# Patient Record
Sex: MALE | NOT HISPANIC OR LATINO
[De-identification: names, ages, dates, MRNs, and addresses within clinical notes are randomized per-mention and may not be internally consistent; named-entity substitution may affect disease eponyms.]

---

## 2017-04-10 ENCOUNTER — CHARTING TRANS (OUTPATIENT)
Dept: OTHER | Age: 82
End: 2017-04-10

## 2017-04-10 ENCOUNTER — HOSPITAL (OUTPATIENT)
Dept: OTHER | Age: 82
End: 2017-04-10
Attending: PSYCHIATRY & NEUROLOGY

## 2017-04-10 ENCOUNTER — DIAGNOSTIC TRANS (OUTPATIENT)
Dept: OTHER | Age: 82
End: 2017-04-10

## 2017-04-10 LAB
ALBUMIN SERPL-MCNC: 3.4 GM/DL (ref 3.6–5.1)
ALP SERPL-CCNC: 67 UNIT/L (ref 45–117)
ALT SERPL-CCNC: 21 UNIT/L
AMORPH SED URNS QL MICRO: ABNORMAL
ANALYZER ANC (IANC): ABNORMAL
ANION GAP SERPL CALC-SCNC: 17 MMOL/L (ref 10–20)
APPEARANCE UR: ABNORMAL
APTT PPP: 25 SECONDS (ref 22–30)
APTT PPP: NORMAL S
AST SERPL-CCNC: 38 UNIT/L
BACTERIA #/AREA URNS HPF: ABNORMAL /HPF
BASOPHILS # BLD: 0 THOUSAND/MCL (ref 0–0.3)
BASOPHILS NFR BLD: 0 %
BILIRUB CONJ SERPL-MCNC: 0.1 MG/DL (ref 0–0.2)
BILIRUB SERPL-MCNC: 1 MG/DL (ref 0.2–1)
BILIRUB UR QL: NEGATIVE
BUN SERPL-MCNC: 37 MG/DL (ref 6–20)
BUN/CREAT SERPL: 20 (ref 7–25)
CALCIUM SERPL-MCNC: 8.6 MG/DL (ref 8.4–10.2)
CAOX CRY URNS QL MICRO: ABNORMAL
CHLORIDE: 106 MMOL/L (ref 98–107)
CK SERPL-CCNC: 911 UNIT/L (ref 39–308)
CO2 SERPL-SCNC: 23 MMOL/L (ref 21–32)
COLOR UR: YELLOW
CREAT SERPL-MCNC: 1.84 MG/DL (ref 0.67–1.17)
DIFFERENTIAL METHOD BLD: ABNORMAL
EOSINOPHIL # BLD: 0 THOUSAND/MCL (ref 0.1–0.5)
EOSINOPHIL NFR BLD: 0 %
EPITH CASTS #/AREA URNS LPF: ABNORMAL /[LPF]
ERYTHROCYTE [DISTWIDTH] IN BLOOD: 19.1 % (ref 11–15)
FATTY CASTS #/AREA URNS LPF: ABNORMAL /[LPF]
FERRITIN SERPL-MCNC: 13 NG/ML (ref 26–388)
GLUCOSE BLDC GLUCOMTR-MCNC: 116 MG/DL (ref 65–99)
GLUCOSE SERPL-MCNC: 111 MG/DL (ref 65–99)
GLUCOSE UR-MCNC: NEGATIVE MG/DL
GRAN CASTS #/AREA URNS LPF: ABNORMAL /[LPF]
HEMATOCRIT: 21.1 % (ref 39–51)
HEMATOCRIT: 25.4 % (ref 39–51)
HGB BLD-MCNC: 5.4 GM/DL (ref 13–17)
HGB BLD-MCNC: 7.1 GM/DL (ref 13–17)
HGB UR QL: ABNORMAL
HYALINE CASTS #/AREA URNS LPF: ABNORMAL /LPF (ref 0–5)
INR PPP: 1.1
IRON SATN MFR SERPL: 4 % (ref 15–45)
IRON SERPL-MCNC: 16 MCG/DL (ref 65–175)
KETONES UR-MCNC: ABNORMAL MG/DL
LACTATE BLDV-MCNC: 1.5 MMOL/L
LEUKOCYTE ESTERASE UR QL STRIP: NEGATIVE
LIPASE SERPL-CCNC: 139 UNIT/L (ref 73–393)
LYMPHOCYTES # BLD: 0.7 THOUSAND/MCL (ref 1–4)
LYMPHOCYTES NFR BLD: 8 %
MCH RBC QN AUTO: 15.7 PG (ref 26–34)
MCHC RBC AUTO-ENTMCNC: 25.6 GM/DL (ref 32–36.5)
MCV RBC AUTO: 61.2 FL (ref 78–100)
MIXED CELL CASTS #/AREA URNS LPF: ABNORMAL /[LPF]
MONOCYTES # BLD: 1 THOUSAND/MCL (ref 0.3–0.9)
MONOCYTES NFR BLD: 10 %
MUCOUS THREADS URNS QL MICRO: PRESENT
NEUTROPHILS # BLD: 7.8 THOUSAND/MCL (ref 1.8–7.7)
NEUTROPHILS NFR BLD: 82 %
NEUTS SEG NFR BLD: ABNORMAL %
NITRITE UR QL: NEGATIVE
PERCENT NRBC: ABNORMAL
PH UR: 5 UNIT (ref 5–7)
PLATELET # BLD: 263 THOUSAND/MCL (ref 140–450)
POTASSIUM SERPL-SCNC: 4.8 MMOL/L (ref 3.4–5.1)
PROT SERPL-MCNC: 6.9 GM/DL (ref 6.4–8.2)
PROT UR QL: 30 MG/DL
PROTHROMBIN TIME: 12.1 SECONDS (ref 9.7–11.8)
PROTHROMBIN TIME: ABNORMAL
RBC # BLD: 3.45 MILLION/MCL (ref 4.5–5.9)
RBC #/AREA URNS HPF: ABNORMAL /HPF (ref 0–3)
RBC CASTS #/AREA URNS LPF: ABNORMAL /[LPF]
RENAL EPI CELLS #/AREA URNS HPF: ABNORMAL /[HPF]
SODIUM SERPL-SCNC: 141 MMOL/L (ref 135–145)
SP GR UR: 1.02 (ref 1–1.03)
SPECIMEN SOURCE: ABNORMAL
SPERM URNS QL MICRO: ABNORMAL
SQUAMOUS #/AREA URNS HPF: ABNORMAL /HPF (ref 0–5)
T VAGINALIS URNS QL MICRO: ABNORMAL
TIBC SERPL-MCNC: 437 MCG/DL (ref 250–450)
TRI-PHOS CRY URNS QL MICRO: ABNORMAL
TROPONIN I SERPL HS-MCNC: 0.6 NG/ML
TROPONIN I SERPL HS-MCNC: 0.86 NG/ML
TROPONIN I SERPL HS-MCNC: 0.87 NG/ML
URATE CRY URNS QL MICRO: ABNORMAL
URINE REFLEX: ABNORMAL
URNS CMNT MICRO: ABNORMAL
UROBILINOGEN UR QL: 0.2 MG/DL (ref 0–1)
WAXY CASTS #/AREA URNS LPF: ABNORMAL /[LPF]
WBC # BLD: 9.5 THOUSAND/MCL (ref 4.2–11)
WBC #/AREA URNS HPF: ABNORMAL /HPF (ref 0–5)
WBC CASTS #/AREA URNS LPF: ABNORMAL /[LPF]
YEAST HYPHAE URNS QL MICRO: ABNORMAL
YEAST URNS QL MICRO: ABNORMAL

## 2017-04-11 LAB
ALBUMIN SERPL-MCNC: 3.2 GM/DL (ref 3.6–5.1)
ALBUMIN/GLOB SERPL: 0.9 {RATIO} (ref 1–2.4)
ALP SERPL-CCNC: 61 UNIT/L (ref 45–117)
ALT SERPL-CCNC: 34 UNIT/L
ANALYZER ANC (IANC): ABNORMAL
ANION GAP SERPL CALC-SCNC: 14 MMOL/L (ref 10–20)
AST SERPL-CCNC: 96 UNIT/L
BASOPHILS # BLD: 0 THOUSAND/MCL (ref 0–0.3)
BASOPHILS NFR BLD: 1 %
BILIRUB SERPL-MCNC: 1.2 MG/DL (ref 0.2–1)
BUN SERPL-MCNC: 30 MG/DL (ref 6–20)
BUN/CREAT SERPL: 21 (ref 7–25)
CALCIUM SERPL-MCNC: 7.9 MG/DL (ref 8.4–10.2)
CHLORIDE: 108 MMOL/L (ref 98–107)
CK SERPL-CCNC: 2092 UNIT/L (ref 39–308)
CO2 SERPL-SCNC: 23 MMOL/L (ref 21–32)
CREAT SERPL-MCNC: 1.44 MG/DL (ref 0.67–1.17)
DIFFERENTIAL METHOD BLD: ABNORMAL
EOSINOPHIL # BLD: 0.1 THOUSAND/MCL (ref 0.1–0.5)
EOSINOPHIL NFR BLD: 1 %
ERYTHROCYTE [DISTWIDTH] IN BLOOD: 24 % (ref 11–15)
FOLATE SERPL-MCNC: >24 NG/ML
GLOBULIN SER-MCNC: 3.5 GM/DL (ref 2–4)
GLUCOSE SERPL-MCNC: 95 MG/DL (ref 65–99)
HEMATOCRIT: 30.4 % (ref 39–51)
HGB A1 MFR BLD ELPH: 98.4 % (ref 96.4–98.2)
HGB A2 MFR BLD ELPH: 1.6 % (ref 1.8–3.4)
HGB BLD-MCNC: 8.4 GM/DL (ref 13–17)
HGB C MFR BLD ELPH: ABNORMAL %
HGB F MFR BLD ELPH: ABNORMAL % (ref 0–2)
HGB FRACT BLD ELPH-IMP: ABNORMAL
HGB OTHER MFR BLD ELPH: ABNORMAL %
HGB S MFR BLD ELPH: ABNORMAL %
HYPOCHROMIA (HYPOC): ABNORMAL
LYMPHOCYTES # BLD: 1.1 THOUSAND/MCL (ref 1–4)
LYMPHOCYTES NFR BLD: 16 %
MCH RBC QN AUTO: 18.5 PG (ref 26–34)
MCHC RBC AUTO-ENTMCNC: 27.6 GM/DL (ref 32–36.5)
MCV RBC AUTO: 66.8 FL (ref 78–100)
MICROCYTOSIS (MICY): ABNORMAL
MONOCYTES # BLD: 0.5 THOUSAND/MCL (ref 0.3–0.9)
MONOCYTES NFR BLD: 7 %
NEUTROPHILS # BLD: 5.5 THOUSAND/MCL (ref 1.8–7.7)
NEUTROPHILS NFR BLD: 75 %
NEUTS SEG NFR BLD: ABNORMAL %
OVALOCYTES (OVALO): ABNORMAL
PATHOLOGIST NAME: ABNORMAL
PERCENT NRBC: ABNORMAL
PLATELET # BLD: 212 THOUSAND/MCL (ref 140–450)
POTASSIUM SERPL-SCNC: 4 MMOL/L (ref 3.4–5.1)
PROT SERPL-MCNC: 6.7 GM/DL (ref 6.4–8.2)
RBC # BLD: 4.55 MILLION/MCL (ref 4.5–5.9)
SHISTOCYTES (SHSTO): ABNORMAL
SODIUM SERPL-SCNC: 141 MMOL/L (ref 135–145)
TROPONIN I SERPL HS-MCNC: 0.65 NG/ML
VIT B12 SERPL-MCNC: 556 PG/ML (ref 211–911)
WBC # BLD: 7.3 THOUSAND/MCL (ref 4.2–11)

## 2017-04-12 ENCOUNTER — DIAGNOSTIC TRANS (OUTPATIENT)
Dept: OTHER | Age: 82
End: 2017-04-12

## 2017-04-12 ENCOUNTER — CHARTING TRANS (OUTPATIENT)
Dept: OTHER | Age: 82
End: 2017-04-12

## 2017-04-12 LAB
ALBUMIN SERPL-MCNC: 2.7 GM/DL (ref 3.6–5.1)
ALBUMIN/GLOB SERPL: 0.8 {RATIO} (ref 1–2.4)
ALP SERPL-CCNC: 52 UNIT/L (ref 45–117)
ALT SERPL-CCNC: 32 UNIT/L
ANALYZER ANC (IANC): ABNORMAL
ANION GAP SERPL CALC-SCNC: 15 MMOL/L (ref 10–20)
AST SERPL-CCNC: 85 UNIT/L
BASOPHILS # BLD: 0 THOUSAND/MCL (ref 0–0.3)
BASOPHILS NFR BLD: 0 %
BILIRUB CONJ SERPL-MCNC: 0.1 MG/DL (ref 0–0.2)
BILIRUB SERPL-MCNC: 0.8 MG/DL (ref 0.2–1)
BUN SERPL-MCNC: 18 MG/DL (ref 6–20)
BUN/CREAT SERPL: 16 (ref 7–25)
CALCIUM SERPL-MCNC: 7.8 MG/DL (ref 8.4–10.2)
CHLORIDE: 109 MMOL/L (ref 98–107)
CK SERPL-CCNC: 1041 UNIT/L (ref 39–308)
CO2 SERPL-SCNC: 23 MMOL/L (ref 21–32)
CREAT SERPL-MCNC: 1.15 MG/DL (ref 0.67–1.17)
DIFFERENTIAL METHOD BLD: ABNORMAL
EOSINOPHIL # BLD: 0.1 THOUSAND/MCL (ref 0.1–0.5)
EOSINOPHIL NFR BLD: 1 %
ERYTHROCYTE [DISTWIDTH] IN BLOOD: 24.5 % (ref 11–15)
FOLATE SERPL-MCNC: >24 NG/ML
GLOBULIN SER-MCNC: 3.3 GM/DL (ref 2–4)
GLUCOSE SERPL-MCNC: 102 MG/DL (ref 65–99)
HAPTOGLOB SERPL-MCNC: 235 MG/DL (ref 36–195)
HEMATOCRIT: 27.2 % (ref 39–51)
HGB BLD-MCNC: 7.4 GM/DL (ref 13–17)
HYPOCHROMIA (HYPOC): ABNORMAL
LACTATE BLDV-SCNC: 1.1 MMOL/L (ref 0–2)
LDH SERPL-CCNC: 255 UNIT/L (ref 86–234)
LYMPHOCYTES # BLD: 1 THOUSAND/MCL (ref 1–4)
LYMPHOCYTES NFR BLD: 11 %
MCH RBC QN AUTO: 18.4 PG (ref 26–34)
MCHC RBC AUTO-ENTMCNC: 27.2 GM/DL (ref 32–36.5)
MCV RBC AUTO: 67.5 FL (ref 78–100)
METAMYELOCYTES NFR BLD: 2 % (ref 0–2)
MICROCYTOSIS (MICY): ABNORMAL
MONOCYTES # BLD: 0.9 THOUSAND/MCL (ref 0.3–0.9)
MONOCYTES NFR BLD: 10 %
NEUTROPHILS # BLD: 7.1 THOUSAND/MCL (ref 1.8–7.7)
NEUTS BAND NFR BLD: 2 %
NEUTS SEG NFR BLD: 74 %
OVALOCYTES (OVALO): ABNORMAL
PATH REV BLD -IMP: ABNORMAL
PLAT MORPH BLD: NORMAL
PLATELET # BLD: 203 THOUSAND/MCL (ref 140–450)
POTASSIUM SERPL-SCNC: 4.4 MMOL/L (ref 3.4–5.1)
PROT SERPL-MCNC: 6 GM/DL (ref 6.4–8.2)
RBC # BLD: 4.03 MILLION/MCL (ref 4.5–5.9)
SHISTOCYTES (SHSTO): ABNORMAL
SODIUM SERPL-SCNC: 143 MMOL/L (ref 135–145)
TSH SERPL-ACNC: 3.2 MCUNIT/ML (ref 0.35–5)
VIT B12 SERPL-MCNC: 622 PG/ML (ref 211–911)
WBC # BLD: 9.3 THOUSAND/MCL (ref 4.2–11)
WBC MORPH BLD: NORMAL

## 2017-04-13 LAB
ALBUMIN SERPL-MCNC: 2.6 GM/DL (ref 3.6–5.1)
ALBUMIN/GLOB SERPL: 0.8 {RATIO} (ref 1–2.4)
ALP SERPL-CCNC: 52 UNIT/L (ref 45–117)
ALT SERPL-CCNC: 39 UNIT/L
ANALYZER ANC (IANC): ABNORMAL
ANION GAP SERPL CALC-SCNC: 15 MMOL/L (ref 10–20)
AST SERPL-CCNC: 101 UNIT/L
BASOPHILS # BLD: 0 THOUSAND/MCL (ref 0–0.3)
BASOPHILS NFR BLD: 0 %
BILIRUB SERPL-MCNC: 0.9 MG/DL (ref 0.2–1)
BUN SERPL-MCNC: 19 MG/DL (ref 6–20)
BUN/CREAT SERPL: 17 (ref 7–25)
CALCIUM SERPL-MCNC: 7.8 MG/DL (ref 8.4–10.2)
CHLORIDE: 101 MMOL/L (ref 98–107)
CK SERPL-CCNC: 1304 UNIT/L (ref 39–308)
CO2 SERPL-SCNC: 24 MMOL/L (ref 21–32)
CREAT SERPL-MCNC: 1.15 MG/DL (ref 0.67–1.17)
D DIMER PPP FEU-MCNC: 2.98 MG/L FEU
DIFFERENTIAL METHOD BLD: ABNORMAL
EOSINOPHIL # BLD: 0 THOUSAND/MCL (ref 0.1–0.5)
EOSINOPHIL NFR BLD: 0 %
ERYTHROCYTE [DISTWIDTH] IN BLOOD: 25.2 % (ref 11–15)
GLOBULIN SER-MCNC: 3.2 GM/DL (ref 2–4)
GLUCOSE SERPL-MCNC: 109 MG/DL (ref 65–99)
HEMATOCRIT: 26.2 % (ref 39–51)
HGB BLD-MCNC: 7.3 GM/DL (ref 13–17)
HYPOCHROMIA (HYPOC): ABNORMAL
IMMATURE RETIC FRACTION: 25.3 % (ref 1.5–16)
LYMPHOCYTES # BLD: 1.1 THOUSAND/MCL (ref 1–4)
LYMPHOCYTES NFR BLD: 15 %
MCH RBC QN AUTO: 18.5 PG (ref 26–34)
MCHC RBC AUTO-ENTMCNC: 27.9 GM/DL (ref 32–36.5)
MCV RBC AUTO: 66.5 FL (ref 78–100)
MICROCYTOSIS (MICY): ABNORMAL
MONOCYTES # BLD: 0.6 THOUSAND/MCL (ref 0.3–0.9)
MONOCYTES NFR BLD: 8 %
NEUTROPHILS # BLD: 5.7 THOUSAND/MCL (ref 1.8–7.7)
NEUTROPHILS NFR BLD: 77 %
NEUTS SEG NFR BLD: ABNORMAL %
OVALOCYTES (OVALO): ABNORMAL
PERCENT NRBC: ABNORMAL
PLAT MORPH BLD: NORMAL
PLATELET # BLD: 191 THOUSAND/MCL (ref 140–450)
POTASSIUM SERPL-SCNC: 3.6 MMOL/L (ref 3.4–5.1)
PROCALCITONIN SERPL IA-MCNC: 0.14 NG/ML
PROT SERPL-MCNC: 5.8 GM/DL (ref 6.4–8.2)
RBC # BLD: 3.94 MILLION/MCL (ref 4.5–5.9)
RETICS #: 15 THOUSAND/MCL (ref 10–120)
RETICS/RBC NFR: 0.4 % (ref 0.3–2.5)
SHISTOCYTES (SHSTO): ABNORMAL
SODIUM SERPL-SCNC: 136 MMOL/L (ref 135–145)
WBC # BLD: 7.3 THOUSAND/MCL (ref 4.2–11)
WBC MORPH BLD: NORMAL

## 2017-04-14 LAB
ALBUMIN SERPL-MCNC: 2.4 GM/DL (ref 3.6–5.1)
ALBUMIN/GLOB SERPL: 0.7 {RATIO} (ref 1–2.4)
ALP SERPL-CCNC: 57 UNIT/L (ref 45–117)
ALT SERPL-CCNC: 39 UNIT/L
ANALYZER ANC (IANC): ABNORMAL
ANION GAP SERPL CALC-SCNC: 11 MMOL/L (ref 10–20)
AST SERPL-CCNC: 90 UNIT/L
BASOPHILS # BLD: 0 THOUSAND/MCL (ref 0–0.3)
BASOPHILS NFR BLD: 0 %
BILIRUB SERPL-MCNC: 1 MG/DL (ref 0.2–1)
BUN SERPL-MCNC: 14 MG/DL (ref 6–20)
BUN/CREAT SERPL: 13 (ref 7–25)
CALCIUM SERPL-MCNC: 7.9 MG/DL (ref 8.4–10.2)
CHLORIDE: 108 MMOL/L (ref 98–107)
CK SERPL-CCNC: 845 UNIT/L (ref 39–308)
CO2 SERPL-SCNC: 26 MMOL/L (ref 21–32)
COLLECT DURATION TIME UR: 24 HR
CREAT SERPL-MCNC: 1.12 MG/DL (ref 0.67–1.17)
DIFFERENTIAL METHOD BLD: ABNORMAL
EOSINOPHIL # BLD: 0 THOUSAND/MCL (ref 0.1–0.5)
EOSINOPHIL NFR BLD: 0 %
ERYTHROCYTE [DISTWIDTH] IN BLOOD: 25.6 % (ref 11–15)
GLOBULIN SER-MCNC: 3.5 GM/DL (ref 2–4)
GLUCOSE SERPL-MCNC: 110 MG/DL (ref 65–99)
GLYCOHEMOGLOBIN: 6 % (ref 4.5–5.6)
HEMATOCRIT: 26.7 % (ref 39–51)
HGB BLD-MCNC: 7.4 GM/DL (ref 13–17)
LYMPHOCYTES # BLD: 1.2 THOUSAND/MCL (ref 1–4)
LYMPHOCYTES NFR BLD: 15 %
M PROTEIN 1 SERPL ELPH-MCNC: ABNORMAL GM/DL
M PROTEIN 2 SERPL ELPH-MCNC: ABNORMAL GM/DL
MCH RBC QN AUTO: 18.5 PG (ref 26–34)
MCHC RBC AUTO-ENTMCNC: 27.7 GM/DL (ref 32–36.5)
MCV RBC AUTO: 66.6 FL (ref 78–100)
MONOCYTES # BLD: 0.7 THOUSAND/MCL (ref 0.3–0.9)
MONOCYTES NFR BLD: 9 %
NEUTROPHILS # BLD: 6.2 THOUSAND/MCL (ref 1.8–7.7)
NEUTROPHILS NFR BLD: 76 %
NEUTS SEG NFR BLD: ABNORMAL %
PATH INTERP SPEC-IMP: ABNORMAL
PATH INTERP SPEC-IMP: ABNORMAL
PERCENT NRBC: ABNORMAL
PLATELET # BLD: 208 THOUSAND/MCL (ref 140–450)
POTASSIUM SERPL-SCNC: 3.4 MMOL/L (ref 3.4–5.1)
PROT 24H UR-MRATE: 2440 MG/24 HR (ref 0–148)
PROT ?TM UR-MCNC: 244 MG/DL
PROT SERPL-MCNC: 5.5 GM/DL (ref 6.4–8.2)
PROT SERPL-MCNC: 5.9 GM/DL (ref 6.4–8.2)
RBC # BLD: 4.01 MILLION/MCL (ref 4.5–5.9)
SODIUM SERPL-SCNC: 142 MMOL/L (ref 135–145)
SPECIMEN VOL UR: 1000 ML
WBC # BLD: 8.2 THOUSAND/MCL (ref 4.2–11)

## 2017-04-15 LAB
CK SERPL-CCNC: 289 UNIT/L (ref 39–308)
GLUCOSE BLDC GLUCOMTR-MCNC: 99 MG/DL (ref 65–99)

## 2017-04-16 LAB
ALBUMIN SERPL-MCNC: 2.3 GM/DL (ref 3.6–5.1)
ALBUMIN/GLOB SERPL: 0.7 {RATIO} (ref 1–2.4)
ALP SERPL-CCNC: 79 UNIT/L (ref 45–117)
ALT SERPL-CCNC: 42 UNIT/L
ANALYZER ANC (IANC): ABNORMAL
ANION GAP SERPL CALC-SCNC: 14 MMOL/L (ref 10–20)
AST SERPL-CCNC: 58 UNIT/L
BASOPHILS # BLD: 0 THOUSAND/MCL (ref 0–0.3)
BASOPHILS NFR BLD: 0 %
BILIRUB SERPL-MCNC: 0.7 MG/DL (ref 0.2–1)
BUN SERPL-MCNC: 13 MG/DL (ref 6–20)
BUN/CREAT SERPL: 13 (ref 7–25)
CALCIUM SERPL-MCNC: 7.8 MG/DL (ref 8.4–10.2)
CHLORIDE: 114 MMOL/L (ref 98–107)
CO2 SERPL-SCNC: 23 MMOL/L (ref 21–32)
CREAT SERPL-MCNC: 0.96 MG/DL (ref 0.67–1.17)
CREAT SERPL-MCNC: 0.97 MG/DL (ref 0.67–1.17)
CREAT SERPL-MCNC: 1.02 MG/DL (ref 0.67–1.17)
DIFFERENTIAL METHOD BLD: ABNORMAL
EOSINOPHIL # BLD: 0.2 THOUSAND/MCL (ref 0.1–0.5)
EOSINOPHIL NFR BLD: 2 %
ERYTHROCYTE [DISTWIDTH] IN BLOOD: 26.4 % (ref 11–15)
GLOBULIN SER-MCNC: 3.4 GM/DL (ref 2–4)
GLUCOSE SERPL-MCNC: 112 MG/DL (ref 65–99)
HEMATOCRIT: 26.5 % (ref 39–51)
HGB BLD-MCNC: 7.3 GM/DL (ref 13–17)
LYMPHOCYTES # BLD: 1 THOUSAND/MCL (ref 1–4)
LYMPHOCYTES NFR BLD: 11 %
MCH RBC QN AUTO: 18.5 PG (ref 26–34)
MCHC RBC AUTO-ENTMCNC: 27.5 GM/DL (ref 32–36.5)
MCV RBC AUTO: 67.3 FL (ref 78–100)
MONOCYTES # BLD: 0.8 THOUSAND/MCL (ref 0.3–0.9)
MONOCYTES NFR BLD: 9 %
NEUTROPHILS # BLD: 7.7 THOUSAND/MCL (ref 1.8–7.7)
NEUTROPHILS NFR BLD: 78 %
NEUTS SEG NFR BLD: ABNORMAL %
PERCENT NRBC: ABNORMAL
PHOSPHATE SERPL-MCNC: 1.8 MG/DL (ref 2.4–4.7)
PLATELET # BLD: 252 THOUSAND/MCL (ref 140–450)
POTASSIUM SERPL-SCNC: 3 MMOL/L (ref 3.4–5.1)
POTASSIUM SERPL-SCNC: 3.1 MMOL/L (ref 3.4–5.1)
POTASSIUM SERPL-SCNC: 4.1 MMOL/L (ref 3.4–5.1)
PROT SERPL-MCNC: 5.7 GM/DL (ref 6.4–8.2)
RBC # BLD: 3.94 MILLION/MCL (ref 4.5–5.9)
SODIUM SERPL-SCNC: 148 MMOL/L (ref 135–145)
WBC # BLD: 9.8 THOUSAND/MCL (ref 4.2–11)

## 2017-04-17 LAB
ACANTHOCYTES (ACANT): ABNORMAL
ALBUMIN SERPL-MCNC: 2.4 GM/DL (ref 3.6–5.1)
ALBUMIN/GLOB SERPL: 0.7 {RATIO} (ref 1–2.4)
ALP SERPL-CCNC: 101 UNIT/L (ref 45–117)
ALT SERPL-CCNC: 48 UNIT/L
ANALYZER ANC (IANC): ABNORMAL
ANION GAP SERPL CALC-SCNC: 13 MMOL/L (ref 10–20)
AST SERPL-CCNC: 67 UNIT/L
BASOPHILS # BLD: 0 THOUSAND/MCL (ref 0–0.3)
BASOPHILS NFR BLD: 0 %
BILIRUB SERPL-MCNC: 0.5 MG/DL (ref 0.2–1)
BUN SERPL-MCNC: 10 MG/DL (ref 6–20)
BUN/CREAT SERPL: 10 (ref 7–25)
CALCIUM SERPL-MCNC: 8 MG/DL (ref 8.4–10.2)
CHLORIDE: 115 MMOL/L (ref 98–107)
CO2 SERPL-SCNC: 23 MMOL/L (ref 21–32)
CREAT SERPL-MCNC: 0.98 MG/DL (ref 0.67–1.17)
DIFFERENTIAL METHOD BLD: ABNORMAL
EOSINOPHIL # BLD: 0.4 THOUSAND/MCL (ref 0.1–0.5)
EOSINOPHIL NFR BLD: 4 %
ERYTHROCYTE [DISTWIDTH] IN BLOOD: 26.8 % (ref 11–15)
GLOBULIN SER-MCNC: 3.6 GM/DL (ref 2–4)
GLUCOSE SERPL-MCNC: 132 MG/DL (ref 65–99)
HEMATOCRIT: 28.2 % (ref 39–51)
HGB BLD-MCNC: 7.7 GM/DL (ref 13–17)
HYPOCHROMIA (HYPOC): ABNORMAL
INR PPP: 1.1
LYMPHOCYTES # BLD: 1.2 THOUSAND/MCL (ref 1–4)
LYMPHOCYTES NFR BLD: 13 %
MCH RBC QN AUTO: 18.5 PG (ref 26–34)
MCHC RBC AUTO-ENTMCNC: 27.3 GM/DL (ref 32–36.5)
MCV RBC AUTO: 67.6 FL (ref 78–100)
MICROCYTOSIS (MICY): ABNORMAL
MONOCYTES # BLD: 0.8 THOUSAND/MCL (ref 0.3–0.9)
MONOCYTES NFR BLD: 9 %
NEUTROPHILS # BLD: 6.9 THOUSAND/MCL (ref 1.8–7.7)
NEUTROPHILS NFR BLD: 74 %
NEUTS SEG NFR BLD: ABNORMAL %
OVALOCYTES (OVALO): ABNORMAL
PERCENT NRBC: ABNORMAL
PHOSPHATE SERPL-MCNC: 1.4 MG/DL (ref 2.4–4.7)
PLAT MORPH BLD: NORMAL
PLATELET # BLD: 278 THOUSAND/MCL (ref 140–450)
POLYCHROMASIA (POLY): ABNORMAL
POTASSIUM SERPL-SCNC: 3.3 MMOL/L (ref 3.4–5.1)
PROT SERPL-MCNC: 6 GM/DL (ref 6.4–8.2)
PROTHROMBIN TIME: 12.2 SECONDS (ref 9.7–11.8)
PROTHROMBIN TIME: ABNORMAL
RBC # BLD: 4.17 MILLION/MCL (ref 4.5–5.9)
SHISTOCYTES (SHSTO): ABNORMAL
SODIUM SERPL-SCNC: 148 MMOL/L (ref 135–145)
TEAR DROP CELLS (TEARD): ABNORMAL
WBC # BLD: 9.3 THOUSAND/MCL (ref 4.2–11)
WBC MORPH BLD: NORMAL

## 2017-04-18 LAB
ALBUMIN SERPL-MCNC: 2.2 GM/DL (ref 3.6–5.1)
ALBUMIN/GLOB SERPL: 0.6 {RATIO} (ref 1–2.4)
ALP SERPL-CCNC: 95 UNIT/L (ref 45–117)
ALT SERPL-CCNC: 46 UNIT/L
ANALYZER ANC (IANC): ABNORMAL
ANION GAP SERPL CALC-SCNC: 12 MMOL/L (ref 10–20)
AST SERPL-CCNC: 48 UNIT/L
BASOPHILS # BLD: 0 THOUSAND/MCL (ref 0–0.3)
BASOPHILS NFR BLD: 0 %
BILIRUB SERPL-MCNC: 0.5 MG/DL (ref 0.2–1)
BUN SERPL-MCNC: 8 MG/DL (ref 6–20)
BUN/CREAT SERPL: 8 (ref 7–25)
CALCIUM SERPL-MCNC: 7.8 MG/DL (ref 8.4–10.2)
CHLORIDE: 111 MMOL/L (ref 98–107)
CO2 SERPL-SCNC: 25 MMOL/L (ref 21–32)
CREAT SERPL-MCNC: 0.97 MG/DL (ref 0.67–1.17)
DIFFERENTIAL METHOD BLD: ABNORMAL
EOSINOPHIL # BLD: 0.5 THOUSAND/MCL (ref 0.1–0.5)
EOSINOPHIL NFR BLD: 5 %
ERYTHROCYTE [DISTWIDTH] IN BLOOD: 27.3 % (ref 11–15)
GLOBULIN SER-MCNC: 3.5 GM/DL (ref 2–4)
GLUCOSE SERPL-MCNC: 119 MG/DL (ref 65–99)
HEMATOCRIT: 26.7 % (ref 39–51)
HGB BLD-MCNC: 7.2 GM/DL (ref 13–17)
INR PPP: 1.1
LYMPHOCYTES # BLD: 1.6 THOUSAND/MCL (ref 1–4)
LYMPHOCYTES NFR BLD: 17 %
MCH RBC QN AUTO: 18.6 PG (ref 26–34)
MCHC RBC AUTO-ENTMCNC: 27 GM/DL (ref 32–36.5)
MCV RBC AUTO: 69 FL (ref 78–100)
MONOCYTES # BLD: 0.7 THOUSAND/MCL (ref 0.3–0.9)
MONOCYTES NFR BLD: 7 %
NEUTROPHILS # BLD: 6.4 THOUSAND/MCL (ref 1.8–7.7)
NEUTROPHILS NFR BLD: 71 %
NEUTS SEG NFR BLD: ABNORMAL %
PERCENT NRBC: ABNORMAL
PHOSPHATE SERPL-MCNC: 2.4 MG/DL (ref 2.4–4.7)
PLATELET # BLD: 331 THOUSAND/MCL (ref 140–450)
POTASSIUM SERPL-SCNC: 3.6 MMOL/L (ref 3.4–5.1)
PROT SERPL-MCNC: 5.7 GM/DL (ref 6.4–8.2)
PROTHROMBIN TIME: 11.9 SECONDS (ref 9.7–11.8)
PROTHROMBIN TIME: ABNORMAL
RBC # BLD: 3.87 MILLION/MCL (ref 4.5–5.9)
SODIUM SERPL-SCNC: 144 MMOL/L (ref 135–145)
WBC # BLD: 9.1 THOUSAND/MCL (ref 4.2–11)

## 2017-04-19 LAB
ANALYZER ANC (IANC): ABNORMAL
ANION GAP SERPL CALC-SCNC: 12 MMOL/L (ref 10–20)
BUN SERPL-MCNC: 8 MG/DL (ref 6–20)
BUN/CREAT SERPL: 8 (ref 7–25)
CALCIUM SERPL-MCNC: 8.2 MG/DL (ref 8.4–10.2)
CHLORIDE: 111 MMOL/L (ref 98–107)
CO2 SERPL-SCNC: 25 MMOL/L (ref 21–32)
CREAT SERPL-MCNC: 0.96 MG/DL (ref 0.67–1.17)
ERYTHROCYTE [DISTWIDTH] IN BLOOD: 27.4 % (ref 11–15)
GLUCOSE BLDC GLUCOMTR-MCNC: 104 MG/DL (ref 65–99)
GLUCOSE SERPL-MCNC: 104 MG/DL (ref 65–99)
HEMATOCRIT: 27.7 % (ref 39–51)
HGB BLD-MCNC: 7.7 GM/DL (ref 13–17)
INR PPP: 1.1
MCH RBC QN AUTO: 19 PG (ref 26–34)
MCHC RBC AUTO-ENTMCNC: 27.8 GM/DL (ref 32–36.5)
MCV RBC AUTO: 68.2 FL (ref 78–100)
PLATELET # BLD: 377 THOUSAND/MCL (ref 140–450)
POTASSIUM SERPL-SCNC: 3.8 MMOL/L (ref 3.4–5.1)
PROTHROMBIN TIME: 11.9 SECONDS (ref 9.7–11.8)
PROTHROMBIN TIME: ABNORMAL
RBC # BLD: 4.06 MILLION/MCL (ref 4.5–5.9)
SODIUM SERPL-SCNC: 144 MMOL/L (ref 135–145)
WBC # BLD: 8.8 THOUSAND/MCL (ref 4.2–11)

## 2017-04-24 ENCOUNTER — PRIOR ORIGINAL RECORDS (OUTPATIENT)
Dept: OTHER | Age: 82
End: 2017-04-24

## 2017-04-26 ENCOUNTER — HOSPITAL (OUTPATIENT)
Dept: OTHER | Age: 82
End: 2017-04-26
Attending: INTERNAL MEDICINE

## 2017-05-01 ENCOUNTER — PRIOR ORIGINAL RECORDS (OUTPATIENT)
Dept: OTHER | Age: 82
End: 2017-05-01

## 2017-06-01 ENCOUNTER — PRIOR ORIGINAL RECORDS (OUTPATIENT)
Dept: OTHER | Age: 82
End: 2017-06-01

## 2017-07-12 ENCOUNTER — PRIOR ORIGINAL RECORDS (OUTPATIENT)
Dept: OTHER | Age: 82
End: 2017-07-12

## 2017-08-10 ENCOUNTER — PRIOR ORIGINAL RECORDS (OUTPATIENT)
Dept: OTHER | Age: 82
End: 2017-08-10

## 2017-09-11 ENCOUNTER — HOSPITAL (OUTPATIENT)
Dept: OTHER | Age: 82
End: 2017-09-11
Attending: INTERNAL MEDICINE

## 2017-09-11 ENCOUNTER — PRIOR ORIGINAL RECORDS (OUTPATIENT)
Dept: OTHER | Age: 82
End: 2017-09-11

## 2017-09-13 ENCOUNTER — PRIOR ORIGINAL RECORDS (OUTPATIENT)
Dept: OTHER | Age: 82
End: 2017-09-13

## 2017-09-14 ENCOUNTER — PRIOR ORIGINAL RECORDS (OUTPATIENT)
Dept: OTHER | Age: 82
End: 2017-09-14

## 2017-09-21 ENCOUNTER — PRIOR ORIGINAL RECORDS (OUTPATIENT)
Dept: OTHER | Age: 82
End: 2017-09-21

## 2017-10-05 ENCOUNTER — PRIOR ORIGINAL RECORDS (OUTPATIENT)
Dept: OTHER | Age: 82
End: 2017-10-05

## 2017-10-12 ENCOUNTER — PRIOR ORIGINAL RECORDS (OUTPATIENT)
Dept: OTHER | Age: 82
End: 2017-10-12

## 2017-10-19 ENCOUNTER — PRIOR ORIGINAL RECORDS (OUTPATIENT)
Dept: OTHER | Age: 82
End: 2017-10-19

## 2017-10-26 ENCOUNTER — PRIOR ORIGINAL RECORDS (OUTPATIENT)
Dept: OTHER | Age: 82
End: 2017-10-26

## 2017-11-09 ENCOUNTER — PRIOR ORIGINAL RECORDS (OUTPATIENT)
Dept: OTHER | Age: 82
End: 2017-11-09

## 2017-12-07 ENCOUNTER — HOSPITAL (OUTPATIENT)
Dept: OTHER | Age: 82
End: 2017-12-07
Attending: INTERNAL MEDICINE

## 2017-12-07 ENCOUNTER — PRIOR ORIGINAL RECORDS (OUTPATIENT)
Dept: OTHER | Age: 82
End: 2017-12-07

## 2017-12-08 ENCOUNTER — HOSPITAL (OUTPATIENT)
Dept: OTHER | Age: 82
End: 2017-12-08
Attending: INTERNAL MEDICINE

## 2017-12-14 ENCOUNTER — HOSPITAL (OUTPATIENT)
Dept: OTHER | Age: 82
End: 2017-12-14
Attending: EMERGENCY MEDICINE

## 2017-12-14 LAB
ANALYZER ANC (IANC): ABNORMAL
ANION GAP SERPL CALC-SCNC: 12 MMOL/L (ref 10–20)
BASOPHILS # BLD: 0 THOUSAND/MCL (ref 0–0.3)
BASOPHILS NFR BLD: 0 %
BUN SERPL-MCNC: 33 MG/DL (ref 6–20)
BUN/CREAT SERPL: 27 (ref 7–25)
CALCIUM SERPL-MCNC: 8.6 MG/DL (ref 8.4–10.2)
CHLORIDE: 110 MMOL/L (ref 98–107)
CO2 SERPL-SCNC: 25 MMOL/L (ref 21–32)
CREAT SERPL-MCNC: 1.23 MG/DL (ref 0.67–1.17)
DIFFERENTIAL METHOD BLD: ABNORMAL
EOSINOPHIL # BLD: 0.1 THOUSAND/MCL (ref 0.1–0.5)
EOSINOPHIL NFR BLD: 1 %
ERYTHROCYTE [DISTWIDTH] IN BLOOD: 19.9 % (ref 11–15)
GLUCOSE SERPL-MCNC: 133 MG/DL (ref 65–99)
HEMATOCRIT: 24.6 % (ref 39–51)
HGB BLD-MCNC: 7.1 GM/DL (ref 13–17)
INR PPP: 2
LYMPHOCYTES # BLD: 0.8 THOUSAND/MCL (ref 1–4)
LYMPHOCYTES NFR BLD: 11 %
MCH RBC QN AUTO: 21.6 PG (ref 26–34)
MCHC RBC AUTO-ENTMCNC: 28.9 GM/DL (ref 32–36.5)
MCV RBC AUTO: 75 FL (ref 78–100)
MONOCYTES # BLD: 0.6 THOUSAND/MCL (ref 0.3–0.9)
MONOCYTES NFR BLD: 9 %
NEUTROPHILS # BLD: 5.7 THOUSAND/MCL (ref 1.8–7.7)
NEUTROPHILS NFR BLD: 79 %
NEUTS SEG NFR BLD: ABNORMAL %
PERCENT NRBC: ABNORMAL
PLATELET # BLD: 401 THOUSAND/MCL (ref 140–450)
POTASSIUM SERPL-SCNC: 4.8 MMOL/L (ref 3.4–5.1)
PROTHROMBIN TIME: 21.5 SECONDS (ref 9.7–11.8)
PROTHROMBIN TIME: ABNORMAL
RBC # BLD: 3.28 MILLION/MCL (ref 4.5–5.9)
SODIUM SERPL-SCNC: 142 MMOL/L (ref 135–145)
WBC # BLD: 7.3 THOUSAND/MCL (ref 4.2–11)

## 2017-12-22 ENCOUNTER — PRIOR ORIGINAL RECORDS (OUTPATIENT)
Dept: OTHER | Age: 82
End: 2017-12-22

## 2017-12-31 ENCOUNTER — PRIOR ORIGINAL RECORDS (OUTPATIENT)
Dept: OTHER | Age: 82
End: 2017-12-31

## 2018-02-12 ENCOUNTER — HOSPITAL (OUTPATIENT)
Dept: OTHER | Age: 83
End: 2018-02-12
Attending: INTERNAL MEDICINE

## 2018-02-12 ENCOUNTER — PRIOR ORIGINAL RECORDS (OUTPATIENT)
Dept: OTHER | Age: 83
End: 2018-02-12

## 2018-02-12 LAB
ANALYZER ANC (IANC): ABNORMAL
ANION GAP SERPL CALC-SCNC: 16 MMOL/L (ref 10–20)
BASOPHILS # BLD: 0.1 THOUSAND/MCL (ref 0–0.3)
BASOPHILS NFR BLD: 1 %
BUN SERPL-MCNC: 28 MG/DL (ref 6–20)
BUN/CREAT SERPL: 19 (ref 7–25)
CALCIUM SERPL-MCNC: 8.7 MG/DL (ref 8.4–10.2)
CHLORIDE: 107 MMOL/L (ref 98–107)
CO2 SERPL-SCNC: 24 MMOL/L (ref 21–32)
CREAT SERPL-MCNC: 1.45 MG/DL (ref 0.67–1.17)
DIFFERENTIAL METHOD BLD: ABNORMAL
EOSINOPHIL # BLD: 0.2 THOUSAND/MCL (ref 0.1–0.5)
EOSINOPHIL NFR BLD: 2 %
ERYTHROCYTE [DISTWIDTH] IN BLOOD: 21.4 % (ref 11–15)
GLUCOSE SERPL-MCNC: 156 MG/DL (ref 65–99)
HEMATOCRIT: 19.6 % (ref 39–51)
HGB BLD-MCNC: 5.3 GM/DL (ref 13–17)
INR PPP: 3.2
LYMPHOCYTES # BLD: 1.3 THOUSAND/MCL (ref 1–4)
LYMPHOCYTES NFR BLD: 16 %
MCH RBC QN AUTO: 19.3 PG (ref 26–34)
MCHC RBC AUTO-ENTMCNC: 27 GM/DL (ref 32–36.5)
MCV RBC AUTO: 71.3 FL (ref 78–100)
MONOCYTES # BLD: 0.5 THOUSAND/MCL (ref 0.3–0.9)
MONOCYTES NFR BLD: 5 %
NEUTROPHILS # BLD: 6.5 THOUSAND/MCL (ref 1.8–7.7)
NEUTROPHILS NFR BLD: 76 %
NEUTS SEG NFR BLD: ABNORMAL %
PERCENT NRBC: ABNORMAL
PLATELET # BLD: 516 THOUSAND/MCL (ref 140–450)
POTASSIUM SERPL-SCNC: 4.5 MMOL/L (ref 3.4–5.1)
PROTHROMBIN TIME: 36 SECONDS (ref 9.7–11.8)
PROTHROMBIN TIME: ABNORMAL
RBC # BLD: 2.75 MILLION/MCL (ref 4.5–5.9)
SODIUM SERPL-SCNC: 142 MMOL/L (ref 135–145)
WBC # BLD: 8.5 THOUSAND/MCL (ref 4.2–11)

## 2018-02-13 ENCOUNTER — PRIOR ORIGINAL RECORDS (OUTPATIENT)
Dept: OTHER | Age: 83
End: 2018-02-13

## 2018-02-13 LAB
ALBUMIN SERPL-MCNC: 2.6 GM/DL (ref 3.6–5.1)
ALBUMIN/GLOB SERPL: 0.8 {RATIO} (ref 1–2.4)
ALP SERPL-CCNC: 81 UNIT/L (ref 45–117)
ALT SERPL-CCNC: 14 UNIT/L
ANALYZER ANC (IANC): ABNORMAL
ANION GAP SERPL CALC-SCNC: 12 MMOL/L (ref 10–20)
AST SERPL-CCNC: 15 UNIT/L
BASOPHILS # BLD: 0 THOUSAND/MCL (ref 0–0.3)
BASOPHILS NFR BLD: 0 %
BILIRUB SERPL-MCNC: 1.2 MG/DL (ref 0.2–1)
BUN SERPL-MCNC: 27 MG/DL (ref 6–20)
BUN/CREAT SERPL: 21 (ref 7–25)
CALCIUM SERPL-MCNC: 8.1 MG/DL (ref 8.4–10.2)
CHLORIDE: 111 MMOL/L (ref 98–107)
CO2 SERPL-SCNC: 25 MMOL/L (ref 21–32)
CREAT SERPL-MCNC: 1.31 MG/DL (ref 0.67–1.17)
DIFFERENTIAL METHOD BLD: ABNORMAL
EOSINOPHIL # BLD: 0.2 THOUSAND/MCL (ref 0.1–0.5)
EOSINOPHIL NFR BLD: 3 %
ERYTHROCYTE [DISTWIDTH] IN BLOOD: 22.3 % (ref 11–15)
GLOBULIN SER-MCNC: 3.3 GM/DL (ref 2–4)
GLUCOSE SERPL-MCNC: 94 MG/DL (ref 65–99)
HEMATOCRIT: 21.5 % (ref 39–51)
HEMATOCRIT: 21.9 % (ref 39–51)
HGB BLD-MCNC: 6.4 GM/DL (ref 13–17)
HGB BLD-MCNC: 6.5 GM/DL (ref 13–17)
INR PPP: 2.2
LYMPHOCYTES # BLD: 1.5 THOUSAND/MCL (ref 1–4)
LYMPHOCYTES NFR BLD: 17 %
MCH RBC QN AUTO: 21.8 PG (ref 26–34)
MCHC RBC AUTO-ENTMCNC: 29.7 GM/DL (ref 32–36.5)
MCV RBC AUTO: 73.5 FL (ref 78–100)
MONOCYTES # BLD: 0.6 THOUSAND/MCL (ref 0.3–0.9)
MONOCYTES NFR BLD: 7 %
NEUTROPHILS # BLD: 6.4 THOUSAND/MCL (ref 1.8–7.7)
NEUTROPHILS NFR BLD: 73 %
NEUTS SEG NFR BLD: ABNORMAL %
PERCENT NRBC: ABNORMAL
PLATELET # BLD: 349 THOUSAND/MCL (ref 140–450)
POTASSIUM SERPL-SCNC: 4.6 MMOL/L (ref 3.4–5.1)
PROT SERPL-MCNC: 5.9 GM/DL (ref 6.4–8.2)
PROTHROMBIN TIME: 23.6 SECONDS (ref 9.7–11.8)
PROTHROMBIN TIME: ABNORMAL
RBC # BLD: 2.98 MILLION/MCL (ref 4.5–5.9)
SODIUM SERPL-SCNC: 143 MMOL/L (ref 135–145)
WBC # BLD: 8.8 THOUSAND/MCL (ref 4.2–11)

## 2018-02-14 LAB
ANALYZER ANC (IANC): ABNORMAL
ERYTHROCYTE [DISTWIDTH] IN BLOOD: 22.8 % (ref 11–15)
HEMATOCRIT: 25.5 % (ref 39–51)
HGB BLD-MCNC: 7.6 GM/DL (ref 13–17)
INR PPP: 1.9
MCH RBC QN AUTO: 22.5 PG (ref 26–34)
MCHC RBC AUTO-ENTMCNC: 29.8 GM/DL (ref 32–36.5)
MCV RBC AUTO: 75.4 FL (ref 78–100)
PLATELET # BLD: 355 THOUSAND/MCL (ref 140–450)
PROTHROMBIN TIME: 20.4 SECONDS (ref 9.7–11.8)
PROTHROMBIN TIME: ABNORMAL
RBC # BLD: 3.38 MILLION/MCL (ref 4.5–5.9)
WBC # BLD: 9.9 THOUSAND/MCL (ref 4.2–11)

## 2018-02-15 LAB
ALBUMIN SERPL-MCNC: 2.6 GM/DL (ref 3.6–5.1)
ALBUMIN/GLOB SERPL: 0.7 {RATIO} (ref 1–2.4)
ALP SERPL-CCNC: 82 UNIT/L (ref 45–117)
ALT SERPL-CCNC: 13 UNIT/L
ANALYZER ANC (IANC): ABNORMAL
ANION GAP SERPL CALC-SCNC: 12 MMOL/L (ref 10–20)
AST SERPL-CCNC: 14 UNIT/L
BASOPHILS # BLD: 0 THOUSAND/MCL (ref 0–0.3)
BASOPHILS NFR BLD: 0 %
BILIRUB SERPL-MCNC: 1.4 MG/DL (ref 0.2–1)
BUN SERPL-MCNC: 24 MG/DL (ref 6–20)
BUN/CREAT SERPL: 18 (ref 7–25)
CALCIUM SERPL-MCNC: 8.3 MG/DL (ref 8.4–10.2)
CHLORIDE: 110 MMOL/L (ref 98–107)
CO2 SERPL-SCNC: 25 MMOL/L (ref 21–32)
CREAT SERPL-MCNC: 1.32 MG/DL (ref 0.67–1.17)
DIFFERENTIAL METHOD BLD: ABNORMAL
EOSINOPHIL # BLD: 0.3 THOUSAND/MCL (ref 0.1–0.5)
EOSINOPHIL NFR BLD: 3 %
ERYTHROCYTE [DISTWIDTH] IN BLOOD: 23.6 % (ref 11–15)
GLOBULIN SER-MCNC: 3.5 GM/DL (ref 2–4)
GLUCOSE SERPL-MCNC: 98 MG/DL (ref 65–99)
HEMATOCRIT: 26.4 % (ref 39–51)
HGB BLD-MCNC: 7.8 GM/DL (ref 13–17)
INR PPP: 1.4
LYMPHOCYTES # BLD: 1.2 THOUSAND/MCL (ref 1–4)
LYMPHOCYTES NFR BLD: 14 %
MCH RBC QN AUTO: 22.5 PG (ref 26–34)
MCHC RBC AUTO-ENTMCNC: 29.5 GM/DL (ref 32–36.5)
MCV RBC AUTO: 76.1 FL (ref 78–100)
MONOCYTES # BLD: 0.8 THOUSAND/MCL (ref 0.3–0.9)
MONOCYTES NFR BLD: 9 %
NEUTROPHILS # BLD: 6.4 THOUSAND/MCL (ref 1.8–7.7)
NEUTROPHILS NFR BLD: 74 %
NEUTS SEG NFR BLD: ABNORMAL %
PERCENT NRBC: ABNORMAL
PLATELET # BLD: 335 THOUSAND/MCL (ref 140–450)
POTASSIUM SERPL-SCNC: 4.1 MMOL/L (ref 3.4–5.1)
PROT SERPL-MCNC: 6.1 GM/DL (ref 6.4–8.2)
PROTHROMBIN TIME: 14.7 SECONDS (ref 9.7–11.8)
PROTHROMBIN TIME: ABNORMAL
RBC # BLD: 3.47 MILLION/MCL (ref 4.5–5.9)
SODIUM SERPL-SCNC: 143 MMOL/L (ref 135–145)
WBC # BLD: 8.7 THOUSAND/MCL (ref 4.2–11)

## 2018-02-16 LAB
ALBUMIN SERPL-MCNC: 2.5 GM/DL (ref 3.6–5.1)
ALBUMIN/GLOB SERPL: 0.7 {RATIO} (ref 1–2.4)
ALP SERPL-CCNC: 77 UNIT/L (ref 45–117)
ALT SERPL-CCNC: 12 UNIT/L
ANALYZER ANC (IANC): ABNORMAL
ANION GAP SERPL CALC-SCNC: 11 MMOL/L (ref 10–20)
AST SERPL-CCNC: 13 UNIT/L
BASOPHILS # BLD: 0 THOUSAND/MCL (ref 0–0.3)
BASOPHILS NFR BLD: 0 %
BILIRUB SERPL-MCNC: 0.9 MG/DL (ref 0.2–1)
BUN SERPL-MCNC: 20 MG/DL (ref 6–20)
BUN/CREAT SERPL: 17 (ref 7–25)
CALCIUM SERPL-MCNC: 8.1 MG/DL (ref 8.4–10.2)
CEA SERPL-MCNC: 2.4 NG/ML (ref 0–5)
CHLORIDE: 107 MMOL/L (ref 98–107)
CO2 SERPL-SCNC: 24 MMOL/L (ref 21–32)
CREAT SERPL-MCNC: 1.15 MG/DL (ref 0.67–1.17)
DIFFERENTIAL METHOD BLD: ABNORMAL
EOSINOPHIL # BLD: 0.2 THOUSAND/MCL (ref 0.1–0.5)
EOSINOPHIL NFR BLD: 3 %
ERYTHROCYTE [DISTWIDTH] IN BLOOD: 23.5 % (ref 11–15)
GLOBULIN SER-MCNC: 3.4 GM/DL (ref 2–4)
GLUCOSE SERPL-MCNC: 104 MG/DL (ref 65–99)
HEMATOCRIT: 25.6 % (ref 39–51)
HGB BLD-MCNC: 7.5 GM/DL (ref 13–17)
LYMPHOCYTES # BLD: 0.9 THOUSAND/MCL (ref 1–4)
LYMPHOCYTES NFR BLD: 11 %
MCH RBC QN AUTO: 22.1 PG (ref 26–34)
MCHC RBC AUTO-ENTMCNC: 29.3 GM/DL (ref 32–36.5)
MCV RBC AUTO: 75.5 FL (ref 78–100)
MONOCYTES # BLD: 0.5 THOUSAND/MCL (ref 0.3–0.9)
MONOCYTES NFR BLD: 6 %
NEUTROPHILS # BLD: 7.1 THOUSAND/MCL (ref 1.8–7.7)
NEUTROPHILS NFR BLD: 80 %
NEUTS SEG NFR BLD: ABNORMAL %
PERCENT NRBC: ABNORMAL
PLATELET # BLD: 308 THOUSAND/MCL (ref 140–450)
POTASSIUM SERPL-SCNC: 3.6 MMOL/L (ref 3.4–5.1)
PROT SERPL-MCNC: 5.9 GM/DL (ref 6.4–8.2)
RBC # BLD: 3.39 MILLION/MCL (ref 4.5–5.9)
SODIUM SERPL-SCNC: 138 MMOL/L (ref 135–145)
WBC # BLD: 8.8 THOUSAND/MCL (ref 4.2–11)

## 2018-02-17 LAB
ALBUMIN SERPL-MCNC: 2.5 GM/DL (ref 3.6–5.1)
ALBUMIN/GLOB SERPL: 0.7 {RATIO} (ref 1–2.4)
ALP SERPL-CCNC: 77 UNIT/L (ref 45–117)
ALT SERPL-CCNC: <6 UNIT/L
ANALYZER ANC (IANC): ABNORMAL
ANION GAP SERPL CALC-SCNC: 12 MMOL/L (ref 10–20)
AST SERPL-CCNC: 10 UNIT/L
BASOPHILS # BLD: 0 THOUSAND/MCL (ref 0–0.3)
BASOPHILS NFR BLD: 0 %
BILIRUB SERPL-MCNC: 0.7 MG/DL (ref 0.2–1)
BUN SERPL-MCNC: 21 MG/DL (ref 6–20)
BUN/CREAT SERPL: 15 (ref 7–25)
CALCIUM SERPL-MCNC: 8.3 MG/DL (ref 8.4–10.2)
CEA SERPL-MCNC: 2 NG/ML (ref 0–5)
CHLORIDE: 109 MMOL/L (ref 98–107)
CO2 SERPL-SCNC: 26 MMOL/L (ref 21–32)
CREAT SERPL-MCNC: 1.42 MG/DL (ref 0.67–1.17)
DIFFERENTIAL METHOD BLD: ABNORMAL
EOSINOPHIL # BLD: 0.3 THOUSAND/MCL (ref 0.1–0.5)
EOSINOPHIL NFR BLD: 4 %
ERYTHROCYTE [DISTWIDTH] IN BLOOD: 23.7 % (ref 11–15)
GLOBULIN SER-MCNC: 3.6 GM/DL (ref 2–4)
GLUCOSE SERPL-MCNC: 101 MG/DL (ref 65–99)
HEMATOCRIT: 27.2 % (ref 39–51)
HGB BLD-MCNC: 8 GM/DL (ref 13–17)
LYMPHOCYTES # BLD: 1.2 THOUSAND/MCL (ref 1–4)
LYMPHOCYTES NFR BLD: 16 %
MCH RBC QN AUTO: 22.2 PG (ref 26–34)
MCHC RBC AUTO-ENTMCNC: 29.4 GM/DL (ref 32–36.5)
MCV RBC AUTO: 75.6 FL (ref 78–100)
MONOCYTES # BLD: 0.6 THOUSAND/MCL (ref 0.3–0.9)
MONOCYTES NFR BLD: 7 %
NEUTROPHILS # BLD: 5.6 THOUSAND/MCL (ref 1.8–7.7)
NEUTROPHILS NFR BLD: 73 %
NEUTS SEG NFR BLD: ABNORMAL %
PERCENT NRBC: ABNORMAL
PLATELET # BLD: 349 THOUSAND/MCL (ref 140–450)
POTASSIUM SERPL-SCNC: 3.8 MMOL/L (ref 3.4–5.1)
PROT SERPL-MCNC: 6.1 GM/DL (ref 6.4–8.2)
RBC # BLD: 3.6 MILLION/MCL (ref 4.5–5.9)
SODIUM SERPL-SCNC: 143 MMOL/L (ref 135–145)
WBC # BLD: 7.7 THOUSAND/MCL (ref 4.2–11)

## 2018-02-26 ENCOUNTER — PRIOR ORIGINAL RECORDS (OUTPATIENT)
Dept: OTHER | Age: 83
End: 2018-02-26

## 2018-03-15 ENCOUNTER — PRIOR ORIGINAL RECORDS (OUTPATIENT)
Dept: OTHER | Age: 83
End: 2018-03-15

## 2018-04-09 ENCOUNTER — PRIOR ORIGINAL RECORDS (OUTPATIENT)
Dept: OTHER | Age: 83
End: 2018-04-09

## 2018-04-30 ENCOUNTER — PRIOR ORIGINAL RECORDS (OUTPATIENT)
Dept: OTHER | Age: 83
End: 2018-04-30

## 2018-05-23 ENCOUNTER — PRIOR ORIGINAL RECORDS (OUTPATIENT)
Dept: OTHER | Age: 83
End: 2018-05-23

## 2018-06-14 ENCOUNTER — PRIOR ORIGINAL RECORDS (OUTPATIENT)
Dept: OTHER | Age: 83
End: 2018-06-14

## 2018-07-24 ENCOUNTER — HOSPITAL (OUTPATIENT)
Dept: OTHER | Age: 83
End: 2018-07-24
Attending: INTERNAL MEDICINE

## 2018-07-30 ENCOUNTER — PRIOR ORIGINAL RECORDS (OUTPATIENT)
Dept: OTHER | Age: 83
End: 2018-07-30

## 2018-08-27 ENCOUNTER — PRIOR ORIGINAL RECORDS (OUTPATIENT)
Dept: OTHER | Age: 83
End: 2018-08-27

## 2018-09-24 ENCOUNTER — PRIOR ORIGINAL RECORDS (OUTPATIENT)
Dept: OTHER | Age: 83
End: 2018-09-24

## 2018-12-31 ENCOUNTER — PRIOR ORIGINAL RECORDS (OUTPATIENT)
Dept: OTHER | Age: 83
End: 2018-12-31

## 2019-01-01 ENCOUNTER — TELEPHONE (OUTPATIENT)
Dept: GASTROENTEROLOGY | Facility: CLINIC | Age: 84
End: 2019-01-01

## 2019-01-01 ENCOUNTER — LAB REQUISITION (OUTPATIENT)
Dept: LAB | Facility: HOSPITAL | Age: 84
End: 2019-01-01
Payer: MEDICARE

## 2019-01-01 ENCOUNTER — APPOINTMENT (OUTPATIENT)
Dept: CT IMAGING | Facility: HOSPITAL | Age: 84
DRG: 392 | End: 2019-01-01
Attending: EMERGENCY MEDICINE
Payer: MEDICARE

## 2019-01-01 ENCOUNTER — HOSPITAL ENCOUNTER (INPATIENT)
Facility: HOSPITAL | Age: 84
LOS: 3 days | DRG: 392 | End: 2019-01-01
Attending: INTERNAL MEDICINE | Admitting: INTERNAL MEDICINE
Payer: OTHER MISCELLANEOUS

## 2019-01-01 ENCOUNTER — HOSPITAL ENCOUNTER (INPATIENT)
Facility: HOSPITAL | Age: 84
LOS: 2 days | Discharge: HOSPICE | DRG: 392 | End: 2019-01-01
Attending: EMERGENCY MEDICINE | Admitting: INTERNAL MEDICINE
Payer: MEDICARE

## 2019-01-01 VITALS
OXYGEN SATURATION: 91 % | WEIGHT: 184 LBS | DIASTOLIC BLOOD PRESSURE: 42 MMHG | HEART RATE: 68 BPM | BODY MASS INDEX: 23.61 KG/M2 | TEMPERATURE: 98 F | RESPIRATION RATE: 18 BRPM | HEIGHT: 74 IN | SYSTOLIC BLOOD PRESSURE: 107 MMHG

## 2019-01-01 VITALS
DIASTOLIC BLOOD PRESSURE: 22 MMHG | OXYGEN SATURATION: 88 % | RESPIRATION RATE: 13 BRPM | TEMPERATURE: 100 F | HEART RATE: 79 BPM | SYSTOLIC BLOOD PRESSURE: 99 MMHG

## 2019-01-01 DIAGNOSIS — R30.0 DYSURIA: ICD-10-CM

## 2019-01-01 DIAGNOSIS — F03.90 DEMENTIA WITHOUT BEHAVIORAL DISTURBANCE (HCC): ICD-10-CM

## 2019-01-01 DIAGNOSIS — R05.9 COUGH: ICD-10-CM

## 2019-01-01 DIAGNOSIS — K52.9 COLITIS: Primary | ICD-10-CM

## 2019-01-01 DIAGNOSIS — C18.9 MALIGNANT NEOPLASM OF COLON, UNSPECIFIED PART OF COLON (HCC): ICD-10-CM

## 2019-01-01 LAB
BACTERIA UR QL AUTO: NEGATIVE /HPF
BILIRUB UR QL: NEGATIVE
CLARITY UR: CLEAR
COLOR UR: YELLOW
GLUCOSE UR-MCNC: NEGATIVE MG/DL
HGB UR QL STRIP.AUTO: NEGATIVE
HYALINE CASTS #/AREA URNS AUTO: 1 /LPF
KETONES UR-MCNC: NEGATIVE MG/DL
LEUKOCYTE ESTERASE UR QL STRIP.AUTO: NEGATIVE
NITRITE UR QL STRIP.AUTO: NEGATIVE
PH UR: 5 [PH] (ref 5–8)
PROT UR-MCNC: NEGATIVE MG/DL
RBC #/AREA URNS AUTO: 0 /HPF
SP GR UR STRIP: 1.02 (ref 1–1.03)
UROBILINOGEN UR STRIP-ACNC: <2
VIT C UR-MCNC: 40 MG/DL
WBC #/AREA URNS AUTO: 2 /HPF

## 2019-01-01 PROCEDURE — 81003 URINALYSIS AUTO W/O SCOPE: CPT | Performed by: INTERNAL MEDICINE

## 2019-01-01 PROCEDURE — 74177 CT ABD & PELVIS W/CONTRAST: CPT | Performed by: EMERGENCY MEDICINE

## 2019-01-01 PROCEDURE — 87631 RESP VIRUS 3-5 TARGETS: CPT | Performed by: INTERNAL MEDICINE

## 2019-01-01 PROCEDURE — 99223 1ST HOSP IP/OBS HIGH 75: CPT | Performed by: INTERNAL MEDICINE

## 2019-01-01 PROCEDURE — 87086 URINE CULTURE/COLONY COUNT: CPT | Performed by: INTERNAL MEDICINE

## 2019-01-01 PROCEDURE — 99232 SBSQ HOSP IP/OBS MODERATE 35: CPT | Performed by: INTERNAL MEDICINE

## 2019-01-01 RX ORDER — ONDANSETRON 2 MG/ML
4 INJECTION INTRAMUSCULAR; INTRAVENOUS ONCE
Status: COMPLETED | OUTPATIENT
Start: 2019-01-01 | End: 2019-01-01

## 2019-01-01 RX ORDER — ATROPINE SULFATE 10 MG/ML
2 SOLUTION/ DROPS OPHTHALMIC EVERY 2 HOUR PRN
Status: DISCONTINUED | OUTPATIENT
Start: 2019-01-01 | End: 2019-01-01

## 2019-01-01 RX ORDER — MORPHINE SULFATE 2 MG/ML
1 INJECTION, SOLUTION INTRAMUSCULAR; INTRAVENOUS
Status: DISCONTINUED | OUTPATIENT
Start: 2019-01-01 | End: 2019-01-01

## 2019-01-01 RX ORDER — SODIUM CHLORIDE 9 MG/ML
INJECTION, SOLUTION INTRAVENOUS CONTINUOUS
Status: DISCONTINUED | OUTPATIENT
Start: 2019-01-01 | End: 2019-01-01

## 2019-01-01 RX ORDER — FUROSEMIDE 10 MG/ML
40 INJECTION INTRAMUSCULAR; INTRAVENOUS EVERY 8 HOURS PRN
Status: DISCONTINUED | OUTPATIENT
Start: 2019-01-01 | End: 2019-01-01

## 2019-01-01 RX ORDER — SCOLOPAMINE TRANSDERMAL SYSTEM 1 MG/1
1 PATCH, EXTENDED RELEASE TRANSDERMAL
Status: DISCONTINUED | OUTPATIENT
Start: 2019-01-01 | End: 2019-01-01

## 2019-01-01 RX ORDER — ONDANSETRON 2 MG/ML
4 INJECTION INTRAMUSCULAR; INTRAVENOUS EVERY 6 HOURS PRN
Status: DISCONTINUED | OUTPATIENT
Start: 2019-01-01 | End: 2019-01-01

## 2019-01-01 RX ORDER — MORPHINE SULFATE 20 MG/ML
5 SOLUTION ORAL
Status: DISCONTINUED | OUTPATIENT
Start: 2019-01-01 | End: 2019-01-01

## 2019-01-01 RX ORDER — MORPHINE SULFATE 20 MG/ML
20 SOLUTION ORAL
Status: DISCONTINUED | OUTPATIENT
Start: 2019-01-01 | End: 2019-01-01

## 2019-01-01 RX ORDER — BISACODYL 10 MG
10 SUPPOSITORY, RECTAL RECTAL
Status: DISCONTINUED | OUTPATIENT
Start: 2019-01-01 | End: 2019-01-01

## 2019-01-01 RX ORDER — LORAZEPAM 2 MG/ML
0.5 INJECTION INTRAMUSCULAR EVERY 4 HOURS PRN
Status: DISCONTINUED | OUTPATIENT
Start: 2019-01-01 | End: 2019-01-01

## 2019-01-01 RX ORDER — 0.9 % SODIUM CHLORIDE 0.9 %
VIAL (ML) INJECTION
Status: COMPLETED
Start: 2019-01-01 | End: 2019-01-01

## 2019-01-01 RX ORDER — MULTIVITAMIN
1 TABLET ORAL DAILY
Status: ON HOLD | COMMUNITY
End: 2019-01-01

## 2019-01-01 RX ORDER — ONDANSETRON 4 MG/1
4 TABLET, ORALLY DISINTEGRATING ORAL EVERY 6 HOURS PRN
Status: DISCONTINUED | OUTPATIENT
Start: 2019-01-01 | End: 2019-01-01

## 2019-01-01 RX ORDER — MORPHINE SULFATE 4 MG/ML
2 INJECTION, SOLUTION INTRAMUSCULAR; INTRAVENOUS ONCE
Status: COMPLETED | OUTPATIENT
Start: 2019-01-01 | End: 2019-01-01

## 2019-01-01 RX ORDER — MORPHINE SULFATE IN 0.9 % NACL 1 MG/ML
1 PLASTIC BAG, INJECTION (ML) INTRAVENOUS CONTINUOUS PRN
Status: DISCONTINUED | OUTPATIENT
Start: 2019-01-01 | End: 2019-01-01

## 2019-01-01 RX ORDER — SODIUM CHLORIDE 9 MG/ML
INJECTION, SOLUTION INTRAVENOUS
Status: COMPLETED
Start: 2019-01-01 | End: 2019-01-01

## 2019-01-01 RX ORDER — GLYCOPYRROLATE 0.2 MG/ML
0.4 INJECTION, SOLUTION INTRAMUSCULAR; INTRAVENOUS
Status: DISCONTINUED | OUTPATIENT
Start: 2019-01-01 | End: 2019-01-01

## 2019-01-01 RX ORDER — LORAZEPAM 2 MG/ML
2 INJECTION INTRAMUSCULAR EVERY 4 HOURS PRN
Status: DISCONTINUED | OUTPATIENT
Start: 2019-01-01 | End: 2019-01-01

## 2019-01-01 RX ORDER — FUROSEMIDE 40 MG/1
40 TABLET ORAL EVERY 8 HOURS PRN
Status: DISCONTINUED | OUTPATIENT
Start: 2019-01-01 | End: 2019-01-01

## 2019-01-01 RX ORDER — HALOPERIDOL 5 MG/ML
1 INJECTION INTRAMUSCULAR
Status: DISCONTINUED | OUTPATIENT
Start: 2019-01-01 | End: 2019-01-01

## 2019-01-01 RX ORDER — ONDANSETRON 2 MG/ML
4 INJECTION INTRAMUSCULAR; INTRAVENOUS EVERY 8 HOURS PRN
Status: DISCONTINUED | OUTPATIENT
Start: 2019-01-01 | End: 2019-01-01

## 2019-01-01 RX ORDER — LORAZEPAM 2 MG/ML
1 INJECTION INTRAMUSCULAR EVERY 4 HOURS PRN
Status: DISCONTINUED | OUTPATIENT
Start: 2019-01-01 | End: 2019-01-01

## 2019-01-01 RX ORDER — ACETAMINOPHEN 325 MG/1
650 TABLET ORAL EVERY 6 HOURS PRN
Status: DISCONTINUED | OUTPATIENT
Start: 2019-01-01 | End: 2019-01-01

## 2019-01-01 RX ORDER — ACETAMINOPHEN 325 MG/1
325 TABLET ORAL EVERY 6 HOURS PRN
Status: ON HOLD | COMMUNITY
End: 2019-01-01

## 2019-01-01 RX ORDER — MORPHINE SULFATE IN 0.9 % NACL 1 MG/ML
1 PLASTIC BAG, INJECTION (ML) INTRAVENOUS CONTINUOUS
Status: DISCONTINUED | OUTPATIENT
Start: 2019-01-01 | End: 2019-01-01

## 2019-01-01 RX ORDER — HALOPERIDOL 5 MG/ML
2 INJECTION INTRAMUSCULAR
Status: DISCONTINUED | OUTPATIENT
Start: 2019-01-01 | End: 2019-01-01

## 2019-01-01 RX ORDER — MORPHINE SULFATE 2 MG/ML
2 INJECTION, SOLUTION INTRAMUSCULAR; INTRAVENOUS EVERY 2 HOUR PRN
Status: DISCONTINUED | OUTPATIENT
Start: 2019-01-01 | End: 2019-01-01

## 2019-01-01 RX ORDER — MEMANTINE HYDROCHLORIDE 10 MG/1
30 TABLET ORAL 2 TIMES DAILY
Status: ON HOLD | COMMUNITY
End: 2019-01-01

## 2019-01-01 RX ORDER — SODIUM CHLORIDE 0.9 % (FLUSH) 0.9 %
10 SYRINGE (ML) INJECTION AS NEEDED
Status: DISCONTINUED | OUTPATIENT
Start: 2019-01-01 | End: 2019-01-01

## 2019-01-10 ENCOUNTER — HOSPITAL (OUTPATIENT)
Dept: OTHER | Age: 84
End: 2019-01-10
Attending: INTERNAL MEDICINE

## 2019-01-10 ENCOUNTER — PRIOR ORIGINAL RECORDS (OUTPATIENT)
Dept: OTHER | Age: 84
End: 2019-01-10

## 2019-01-11 ENCOUNTER — HOSPITAL (OUTPATIENT)
Dept: OTHER | Age: 84
End: 2019-01-11
Attending: INTERNAL MEDICINE

## 2019-01-23 ENCOUNTER — PRIOR ORIGINAL RECORDS (OUTPATIENT)
Dept: OTHER | Age: 84
End: 2019-01-23

## 2019-02-04 ENCOUNTER — PRIOR ORIGINAL RECORDS (OUTPATIENT)
Dept: OTHER | Age: 84
End: 2019-02-04

## 2019-02-08 ENCOUNTER — HOSPITAL (OUTPATIENT)
Dept: OTHER | Age: 84
End: 2019-02-08
Attending: INTERNAL MEDICINE

## 2019-02-25 ENCOUNTER — PRIOR ORIGINAL RECORDS (OUTPATIENT)
Dept: OTHER | Age: 84
End: 2019-02-25

## 2019-03-21 ENCOUNTER — PRIOR ORIGINAL RECORDS (OUTPATIENT)
Dept: OTHER | Age: 84
End: 2019-03-21

## 2019-04-14 PROBLEM — D64.9 ANEMIA: Status: ACTIVE | Noted: 2019-01-01

## 2019-04-14 PROBLEM — K52.9 COLITIS: Status: ACTIVE | Noted: 2019-01-01

## 2019-04-14 PROBLEM — R73.9 HYPERGLYCEMIA: Status: ACTIVE | Noted: 2019-01-01

## 2019-04-14 PROBLEM — C18.9 MALIGNANT NEOPLASM OF COLON, UNSPECIFIED PART OF COLON (HCC): Status: ACTIVE | Noted: 2019-01-01

## 2019-04-14 PROBLEM — D72.829 LEUKOCYTOSIS: Status: ACTIVE | Noted: 2019-01-01

## 2019-04-14 NOTE — PROGRESS NOTES
[facility]  Antibiotic Dosing    Fernie Benavides is a 80year old male for whom pharmacy is dosing Zosyn for treatment of  intra-abdominal infection. .  Other antibiotics (Not dosed by pharmacy): Allergies: has No Known Allergies.

## 2019-04-14 NOTE — ED PROVIDER NOTES
Patient Seen in: [facility] Emergency Department    History   Patient presents with:  Abdomen/Flank Pain (GI/)    Stated Complaint: abdominal pain     HPI    Patient complains of r sided  abdominal pain that began 4 days ago.   Pain described as range of motion  Psychiatric: patient is pleasant, there is no agitation    DIFFERENTIAL DIAGNOSIS: After history and physical exam differential diagnosis was considered for  Biliary colic vs. Appendicitis vs. Pancreatitis vs. Perforated viscous vs. obstru Only)(cpt=74177)    Result Date: 4/14/2019  CONCLUSION:  1. Multiple hepatic lesions consistent with metastatic disease. 2. Abnormal appearing cecum and ascending colon with a matted thick-walled appearance.   The bowel appears to be adherent to the abdomin

## 2019-04-14 NOTE — CONSULTS
[facility]  Report of Consultation    Asia Gardner Patient Status:  Inpatient    1929 MRN [id number]   Location [facility] 4W/SW/SE Attending Bernadette Simmons MD   Hosp Day # 0 PCP No primary care provider on file.      Reason for Co pressure 140/47, pulse 91, temperature 100.3 °F (37.9 °C), temperature source Oral, resp. rate 18, height 6' 2\" (1.88 m), weight 184 lb (83.5 kg), SpO2 95 %. General: Alert, orientated x3. Cooperative. No apparent distress.   HEENT: Exam is [employer]

## 2019-04-14 NOTE — TELEPHONE ENCOUNTER
Attempted to contact the pts nephew, Roberto Agee (who is power of ) to call me to review his care. No answer and the line when dead after ringing multiple times.

## 2019-04-14 NOTE — PROGRESS NOTES
GI Addendum  Discussed with Dr. Cabrera Courser - no indication for colon stent.    He is planning discussion for palliatve care   Dr. Augustus Gray

## 2019-04-14 NOTE — ED NOTES
CALL FLOOR TO GIVE REPORT. RN WILL CALL BACK. PATIENT MEDICATED PER ORDER. PATIENT RESTING ON CART WITH EYES CLOSED BUT WAKES AND RESPONDS TO VERBAL STIMULI.  VSS.   RESPIRATIONS EQUAL/REGULAR/SPONTANEOUS/NONLABORED

## 2019-04-15 NOTE — ACP (ADVANCE CARE PLANNING)
Met with nephew and he signed consents for [facility]. The goal is to have the patient go back to [place] on 4/16/19 and have Hospice  at facility. POC was discussed with Darlin An RN and Dr Castillo Chowdhury. Will follow up tomorrow.

## 2019-04-15 NOTE — WOUND PROGRESS NOTE
WOUND CARE NOTE      PLAN   Recommendations:  Dr. Aaron Conroy currently on consult  Turn schedules  Heels elevated using pillows, heel wedge or heel boots to offload heels  Prompt incontinence care  Moisture barrier for incontinence.  May consider Andrzej [facility]

## 2019-04-15 NOTE — PLAN OF CARE
Pt is NPO. IVF infusing, Zosyn given. Morphine prn given for abdominal pain. Voiding per urinal with assistance. Repositioned prn. Bed alarm engaged at all times, frequent rounding completed. Safety plan in place.      Problem: Patient Centered Care  Goal: period  Description  INTERVENTIONS  - Monitor WBC  - Administer growth factors as ordered  - Implement neutropenic guidelines  Outcome: Progressing

## 2019-04-15 NOTE — CM/SW NOTE
SW received MDO for hospice.  BERTRAM notified Vi Hanley [id number] from [facility] and made referral.     Bang Millan, GOSIA - ext. [phone]

## 2019-04-15 NOTE — PLAN OF CARE
Problem: Patient Centered Care  Goal: Patient preferences are identified and integrated in the patient's plan of care  Description  Interventions:  - What would you like us to know as we care for you?  For my abdominal pain to be controlled  - Provide juan diego pre-medicate as appropriate  Outcome: Progressing     Problem: RISK FOR INFECTION - ADULT  Goal: Absence of fever/infection during anticipated neutropenic period  Description  INTERVENTIONS  - Monitor WBC  - Administer growth factors as ordered  - Umesh

## 2019-04-15 NOTE — CM/SW NOTE
MSW met with nephew/POA  To sign hospice consents. Paperwork was signed , dated for tomorrow 4/16/19. POC is for pt to go back home to [place].   SAM Magdaleno  [facility] [[email or web address]]  [phone]

## 2019-04-15 NOTE — PROGRESS NOTES
[facility]     Gastroenterology Progress Note    Ike Weems Patient Status:  Inpatient    1929 MRN [id number]   Location [facility] 4W/SW/SE Attending Danita Dowell MD   Hosp Day # 1 PCP No primary care provide Only)(cpt=74177)    Result Date: 4/14/2019  CONCLUSION:  1. Multiple hepatic lesions consistent with metastatic disease. 2. Abnormal appearing cecum and ascending colon with a matted thick-walled appearance.   The bowel appears to be adherent to the abdomin

## 2019-04-15 NOTE — PROGRESS NOTES
[facility]    CDI Prediction Tool Protocol (Vancomycin Initiated)    OVP (oral vancomycin prophylaxis) 125 mg PO Daily is being started in this patient based on a score of 13.       Score Breakdown:  High risk antibiotic use (5 points)  Malignanc

## 2019-04-16 PROBLEM — C18.9 COLON CANCER (HCC): Status: ACTIVE | Noted: 2019-01-01

## 2019-04-16 NOTE — PLAN OF CARE
Ta resting comfortable overnight. Pain managed with Roxanal. Complete bath given. Voiding per urinal. IVF infusing. Zosyn continued. Bed locked and low. Patient calling appropriately. Plan is for patient to return to [facility] on hosptice.     Problem: Bessy Manage/alleviate anxiety  - Utilize distraction and/or relaxation techniques  - Monitor for opioid side effects  - Notify MD/LIP if interventions unsuccessful or patient reports new pain  - Anticipate increased pain with activity and pre-medicate as approp

## 2019-04-16 NOTE — PLAN OF CARE
Problem: PAIN - ADULT  Goal: Verbalizes/displays adequate comfort level or patient's stated pain goal  Description  INTERVENTIONS:  - Encourage pt to monitor pain and request assistance  - Assess pain using appropriate pain scale  - Administer analgesics traditions  - Initiate Spiritual Care consult as needed  Outcome: Progressing     Patient admitted to inpatient hospice for management of pain. Morphine infusion initiated for comfort. Currently infusing at 2mg/hr.   Patient appears comfortable at this ti

## 2019-04-16 NOTE — DISCHARGE SUMMARY
[place] [facility] - [facility]    Discharge Summary    Azam Maloney Patient Status:  Inpatient    1929 MRN [id number]   Location [facility] 4W/SW/SE Attending Minnie Cohn MD   Hosp Day # 2 PCP No primary care provider on file.      Date of Physician  Internal Medicine            Discharge Plan:   Discharge Medications: per hospice        Discharge Diet: as needed  Discharge Condition: Serious  Disposition: hospice    Follow up:             Jourdan Tobar  Office: [phone]  4/16/2019

## 2019-04-17 NOTE — PLAN OF CARE
Inpatient hospice care provided. Morphine gtt @ 2mg/hr. No titration needed. Gomes care provided. 1L O2 placed for comfort. Patient appears comfortable.  Will continue to [facility]

## 2019-04-17 NOTE — H&P
[place] [facility] - [facility]    History and Physical    Joceline Ontiveroshira Patient Status:  Inpatient    1929 MRN [id number]   Location [facility] 4W/SW/SE Attending Ariella Kulkarni MD   Hosp Day # 1 PCP No primary care provider on file.      Date (H) 04/15/2019     04/15/2019    K 4.3 04/15/2019     04/15/2019    CO2 26.0 04/15/2019     (H) 04/15/2019    CA 8.4 (L) 04/15/2019    ALB 2.9 (L) 04/14/2019    ALKPHO 206 (H) 04/14/2019    BILT 1.1 04/14/2019    TP 7.0 04/14/2019    AST

## 2019-04-17 NOTE — HOSPICE RN NOTE
GIP DAY 2  Morphine drip is at 5mg/hour to manage his dyspnea and pain to his abdomen  MAX dose was increased to 10mg/hour per Dr Nadine Padilla  NPO  Low grade fever noted  POC was discussed with Celeste Goldman RN and with Santa his nephew  PPS 10%  Patient remains inpa

## 2019-04-17 NOTE — PLAN OF CARE
Problem: PAIN - ADULT  Goal: Verbalizes/displays adequate comfort level or patient's stated pain goal  Description  INTERVENTIONS:  - Encourage pt to monitor pain and request assistance  - Assess pain using appropriate pain scale  - Administer analgesics traditions  - Initiate Spiritual Care consult as needed  Outcome: Progressing     Problem: Patient Centered Care  Goal: Patient preferences are identified and integrated in the patient's plan of care  Description  Interventions:  - What would you like us t

## 2019-04-18 NOTE — CM/SW NOTE
MSW visit 4/18/19. The pt appeared much the same as yesterday, nephew not present. Pt appeared calm, msw provided supportive presence.   Kali Cottrell, MSW  [facility]  [phone]

## 2019-04-18 NOTE — HOSPICE RN NOTE
GIP DAY 3  Morphine drip is at 7mg/hour to manage his dyspnea and any pain  NPO  Periods of apnea noted  Hands and feet are starting to mottle and are cool to the touch  PPS 10%  POC was discussed with Emily Kwon RN  patient remains inpatient at this time and

## 2019-04-19 NOTE — PLAN OF CARE
Pt  at [address]. Resusitation not attempted as pt was DNR.     Time of Death 06:05    Family Notified Yes  [facility] and [facility] Not[facility] Yes (get Rep Name and Case Number) Gays Maria Eugenia  Ref# [id number]    Co

## 2019-04-19 NOTE — DISCHARGE SUMMARY
[facility]    Discharge Summary    Unknown [facility] Patient Status:  Inpatient    1929 MRN [id number]   Location [facility] 4W/SW/SE Attending No att. providers found   [facility] Day # 3 PCP No primary care provider on file.      Amy Peterson

## 2019-12-31 ENCOUNTER — PRIOR ORIGINAL RECORDS (OUTPATIENT)
Dept: OTHER | Age: 84
End: 2019-12-31

## 2020-10-09 LAB
% SATURATION: 10 % (CALC) (ref 15–60)
% SATURATION: 13 % (CALC) (ref 15–60)
% SATURATION: 15 % (CALC) (ref 15–60)
% SATURATION: 17 % (CALC) (ref 15–60)
% SATURATION: 21 % (CALC) (ref 15–60)
% SATURATION: 3 % (CALC) (ref 15–60)
% SATURATION: 4 % (CALC) (ref 15–60)
% SATURATION: 5 % (CALC) (ref 15–60)
% SATURATION: 6 % (CALC) (ref 15–60)
% SATURATION: 6 % (CALC) (ref 15–60)
% SATURATION: 7 % (CALC) (ref 15–60)
% SATURATION: 8 % (CALC) (ref 15–60)
% SATURATION: 9 % (CALC) (ref 15–60)
ALBUMIN/GLOB SERPL: 1.1 (CALC) (ref 1–2.5)
ALBUMIN/GLOB SERPL: 1.2 (CALC) (ref 1–2.5)
ALBUMIN/GLOB SERPL: 1.3 (CALC) (ref 1–2.5)
ALBUMIN/GLOB SERPL: 1.4 (CALC) (ref 1–2.5)
ALBUMIN/GLOB SERPL: 1.5 (CALC) (ref 1–2.5)
ALBUMIN/GLOB SERPL: 1.6 (CALC) (ref 1–2.5)
ALBUMIN/GLOB SERPL: 1.7 (CALC) (ref 1–2.5)
ALBUMIN/GLOB SERPL: 1.8 (CALC) (ref 1–2.5)
ALBUMIN: 3.2 G/DL (ref 3.6–5.1)
ALBUMIN: 3.3 G/DL (ref 3.6–5.1)
ALBUMIN: 3.4 G/DL (ref 3.6–5.1)
ALBUMIN: 3.5 G/DL (ref 3.6–5.1)
ALBUMIN: 3.6 G/DL (ref 3.6–5.1)
ALBUMIN: 3.7 G/DL (ref 3.6–5.1)
ALBUMIN: 3.8 G/DL (ref 3.6–5.1)
ALBUMIN: 3.9 G/DL (ref 3.6–5.1)
ALKALINE PHOSPHATASE: 101 UNIT/L (ref 40–115)
ALKALINE PHOSPHATASE: 136 UNIT/L (ref 40–115)
ALKALINE PHOSPHATASE: 171 UNIT/L (ref 40–115)
ALKALINE PHOSPHATASE: 58 UNIT/L (ref 40–115)
ALKALINE PHOSPHATASE: 59 UNIT/L (ref 40–115)
ALKALINE PHOSPHATASE: 64 UNIT/L (ref 40–115)
ALKALINE PHOSPHATASE: 70 UNIT/L (ref 40–115)
ALKALINE PHOSPHATASE: 73 UNIT/L (ref 40–115)
ALKALINE PHOSPHATASE: 75 UNIT/L (ref 40–115)
ALKALINE PHOSPHATASE: 79 UNIT/L (ref 40–115)
ALKALINE PHOSPHATASE: 80 UNIT/L (ref 40–115)
ALKALINE PHOSPHATASE: 84 UNIT/L (ref 40–115)
ALKALINE PHOSPHATASE: 85 UNIT/L (ref 40–115)
ALKALINE PHOSPHATASE: 88 UNIT/L (ref 40–115)
ALKALINE PHOSPHATASE: 89 UNIT/L (ref 40–115)
ALKALINE PHOSPHATASE: 93 UNIT/L (ref 40–115)
ALT: 10 UNIT/L (ref 9–46)
ALT: 10 UNIT/L (ref 9–46)
ALT: 11 UNIT/L (ref 9–46)
ALT: 11 UNIT/L (ref 9–46)
ALT: 13 UNIT/L (ref 9–46)
ALT: 14 UNIT/L (ref 9–46)
ALT: 15 UNIT/L (ref 9–46)
ALT: 7 UNIT/L (ref 9–46)
ALT: 8 UNIT/L (ref 9–46)
ALT: 9 UNIT/L (ref 9–46)
AST: 11 UNIT/L (ref 10–35)
AST: 12 UNIT/L (ref 10–35)
AST: 12 UNIT/L (ref 10–35)
AST: 13 UNIT/L (ref 10–35)
AST: 14 UNIT/L (ref 10–35)
AST: 15 UNIT/L (ref 10–35)
AST: 16 UNIT/L (ref 10–35)
AST: 17 UNIT/L (ref 10–35)
AST: 17 UNIT/L (ref 10–35)
AST: 18 UNIT/L (ref 10–35)
AST: 19 UNIT/L (ref 10–35)
AST: 24 UNIT/L (ref 10–35)
BASO%: 0.2 %
BASO%: 0.3 %
BASO%: 0.4 %
BASO%: 0.5 %
BASO%: 0.5 %
BASO%: 0.6 %
BASO%: 0.6 %
BASO%: 0.7 %
BASO%: 0.7 %
BASO%: 0.8 %
BASO%: 0.9 %
BASO: 0 10^3/UL
BASO: 0.1 10^3/UL
BILIRUBIN, TOTAL: 0.4 MG/DL (ref 0.2–1.2)
BILIRUBIN, TOTAL: 0.5 MG/DL (ref 0.2–1.2)
BILIRUBIN, TOTAL: 0.6 MG/DL (ref 0.2–1.2)
BILIRUBIN, TOTAL: 0.7 MG/DL (ref 0.2–1.2)
BILIRUBIN, TOTAL: 0.7 MG/DL (ref 0.2–1.2)
BILIRUBIN, TOTAL: 1 MG/DL (ref 0.2–1.2)
BILIRUBIN, TOTAL: 1.2 MG/DL (ref 0.2–1.2)
BUN/CREATININE RATIO: 17 (CALC) (ref 6–22)
BUN/CREATININE RATIO: 18 (CALC) (ref 6–22)
BUN/CREATININE RATIO: 19 (CALC) (ref 6–22)
BUN/CREATININE RATIO: 19 (CALC) (ref 6–22)
BUN/CREATININE RATIO: 20 (CALC) (ref 6–22)
BUN/CREATININE RATIO: 21 (CALC) (ref 6–22)
BUN/CREATININE RATIO: 22 (CALC) (ref 6–22)
BUN/CREATININE RATIO: 23 (CALC) (ref 6–22)
BUN/CREATININE RATIO: 24 (CALC) (ref 6–22)
BUN/CREATININE RATIO: 25 (CALC) (ref 6–22)
BUN/CREATININE RATIO: 27 (CALC) (ref 6–22)
CALCIUM: 8.4 MG/DL (ref 8.6–10.3)
CALCIUM: 8.6 MG/DL (ref 8.6–10.3)
CALCIUM: 8.6 MG/DL (ref 8.6–10.3)
CALCIUM: 8.7 MG/DL (ref 8.6–10.3)
CALCIUM: 8.8 MG/DL (ref 8.6–10.3)
CALCIUM: 8.9 MG/DL (ref 8.6–10.3)
CALCIUM: 9 MG/DL (ref 8.6–10.3)
CALCIUM: 9.1 MG/DL (ref 8.6–10.3)
CALCIUM: 9.1 MG/DL (ref 8.6–10.3)
CALCIUM: 9.2 MG/DL (ref 8.6–10.3)
CALCIUM: 9.3 MG/DL (ref 8.6–10.3)
CALCIUM: 9.3 MG/DL (ref 8.6–10.3)
CARBON DIOXIDE: 19 MMOL/L (ref 20–31)
CARBON DIOXIDE: 20 MMOL/L (ref 20–31)
CARBON DIOXIDE: 23 MMOL/L (ref 20–31)
CARBON DIOXIDE: 23 MMOL/L (ref 20–31)
CARBON DIOXIDE: 24 MMOL/L (ref 20–31)
CARBON DIOXIDE: 24 MMOL/L (ref 20–31)
CARBON DIOXIDE: 24 MMOL/L (ref 20–32)
CARBON DIOXIDE: 24 MMOL/L (ref 20–32)
CARBON DIOXIDE: 25 MMOL/L (ref 20–31)
CARBON DIOXIDE: 26 MMOL/L (ref 20–31)
CARBON DIOXIDE: 27 MMOL/L (ref 20–31)
CARBON DIOXIDE: 28 MMOL/L (ref 20–32)
CARBON DIOXIDE: 28 MMOL/L (ref 20–32)
CEA: 1.5 NG/ML
CEA: 1.6 NG/ML
CEA: 1.7 NG/ML
CEA: 1.7 NG/ML
CEA: 1.8 NG/ML
CEA: 2.3 NG/ML
CEA: 5 NG/ML
CEA: 6.5 NG/ML
CEA: 9.8 NG/ML
CHLORIDE: 103 MMOL/L (ref 98–110)
CHLORIDE: 103 MMOL/L (ref 98–110)
CHLORIDE: 104 MMOL/L (ref 98–110)
CHLORIDE: 105 MMOL/L (ref 98–110)
CHLORIDE: 106 MMOL/L (ref 98–110)
CHLORIDE: 107 MMOL/L (ref 98–110)
CHLORIDE: 108 MMOL/L (ref 98–110)
CHLORIDE: 109 MMOL/L (ref 98–110)
CHLORIDE: 111 MMOL/L (ref 98–110)
CHLORIDE: 112 MMOL/L (ref 98–110)
CRCL (C&G) (MOSAIQ HL): 37.22 ML/MIN
CRCL (C&G) (MOSAIQ HL): 39.58 ML/MIN
CRCL (C&G) (MOSAIQ HL): 42.5 ML/MIN
CRCL (C&G) (MOSAIQ HL): 42.6 ML/MIN
CRCL (C&G) (MOSAIQ HL): 42.92 ML/MIN
CRCL (C&G) (MOSAIQ HL): 44.2 ML/MIN
CRCL (C&G) (MOSAIQ HL): 45.64 ML/MIN
CRCL (C&G) (MOSAIQ HL): 46.78 ML/MIN
CRCL (C&G) (MOSAIQ HL): 46.84 ML/MIN
CRCL (C&G) (MOSAIQ HL): 48.09 ML/MIN
CRCL (C&G) (MOSAIQ HL): 48.38 ML/MIN
CRCL (C&G) (MOSAIQ HL): 49 ML/MIN
CRCL (C&G) (MOSAIQ HL): 49.41 ML/MIN
CRCL (C&G) (MOSAIQ HL): 50.1 ML/MIN
CRCL (C&G) (MOSAIQ HL): 52.42 ML/MIN
CRCL (C&G) (MOSAIQ HL): 55.67 ML/MIN
CREATININE CLEARANCE (MOSAIQ HL): 27.8 ML/MIN
CREATININE CLEARANCE (MOSAIQ HL): 29.7 ML/MIN
CREATININE CLEARANCE (MOSAIQ HL): 31.7 ML/MIN
CREATININE CLEARANCE (MOSAIQ HL): 32.4 ML/MIN
CREATININE CLEARANCE (MOSAIQ HL): 32.5 ML/MIN
CREATININE CLEARANCE (MOSAIQ HL): 33.8 ML/MIN
CREATININE CLEARANCE (MOSAIQ HL): 33.8 ML/MIN
CREATININE CLEARANCE (MOSAIQ HL): 34 ML/MIN
CREATININE CLEARANCE (MOSAIQ HL): 34.1 ML/MIN
CREATININE CLEARANCE (MOSAIQ HL): 35 ML/MIN
CREATININE CLEARANCE (MOSAIQ HL): 35.7 ML/MIN
CREATININE CLEARANCE (MOSAIQ HL): 36 ML/MIN
CREATININE CLEARANCE (MOSAIQ HL): 36.6 ML/MIN
CREATININE CLEARANCE (MOSAIQ HL): 36.6 ML/MIN
CREATININE CLEARANCE (MOSAIQ HL): 38.6 ML/MIN
CREATININE CLEARANCE (MOSAIQ HL): 39.3 ML/MIN
CREATININE: 1.13 MG/DL (ref 0.7–1.11)
CREATININE: 1.13 MG/DL (ref 0.7–1.11)
CREATININE: 1.19 MG/DL (ref 0.7–1.11)
CREATININE: 1.19 MG/DL (ref 0.7–1.11)
CREATININE: 1.21 MG/DL (ref 0.7–1.11)
CREATININE: 1.22 MG/DL (ref 0.7–1.11)
CREATININE: 1.26 MG/DL (ref 0.7–1.11)
CREATININE: 1.27 MG/DL (ref 0.7–1.11)
CREATININE: 1.28 MG/DL (ref 0.7–1.11)
CREATININE: 1.29 MG/DL (ref 0.7–1.11)
CREATININE: 1.29 MG/DL (ref 0.7–1.11)
CREATININE: 1.32 MG/DL (ref 0.7–1.11)
CREATININE: 1.34 MG/DL (ref 0.7–1.11)
CREATININE: 1.35 MG/DL (ref 0.7–1.11)
CREATININE: 1.44 MG/DL (ref 0.7–1.11)
CREATININE: 1.54 MG/DL (ref 0.7–1.11)
EGFR AFRICAN AMERICAN: 46 ML/MIN/1.73M2
EGFR AFRICAN AMERICAN: 50 ML/MIN/1.73M2
EGFR AFRICAN AMERICAN: 54 ML/MIN/1.73M2
EGFR AFRICAN AMERICAN: 54 ML/MIN/1.73M2
EGFR AFRICAN AMERICAN: 55 ML/MIN/1.73M2
EGFR AFRICAN AMERICAN: 57 ML/MIN/1.73M2
EGFR AFRICAN AMERICAN: 57 ML/MIN/1.73M2
EGFR AFRICAN AMERICAN: 58 ML/MIN/1.73M2
EGFR AFRICAN AMERICAN: 61 ML/MIN/1.73M2
EGFR AFRICAN AMERICAN: 62 ML/MIN/1.73M2
EGFR AFRICAN AMERICAN: 63 ML/MIN/1.73M2
EGFR AFRICAN AMERICAN: 63 ML/MIN/1.73M2
EGFR AFRICAN AMERICAN: 67 ML/MIN/1.73M2
EGFR AFRICAN AMERICAN: 67 ML/MIN/1.73M2
EGFR NON-AFR. AMERICAN: 39 ML/MIN/1.73M2
EGFR NON-AFR. AMERICAN: 43 ML/MIN/1.73M2
EGFR NON-AFR. AMERICAN: 46 ML/MIN/1.73M2
EGFR NON-AFR. AMERICAN: 47 ML/MIN/1.73M2
EGFR NON-AFR. AMERICAN: 47 ML/MIN/1.73M2
EGFR NON-AFR. AMERICAN: 49 ML/MIN/1.73M2
EGFR NON-AFR. AMERICAN: 49 ML/MIN/1.73M2
EGFR NON-AFR. AMERICAN: 50 ML/MIN/1.73M2
EGFR NON-AFR. AMERICAN: 53 ML/MIN/1.73M2
EGFR NON-AFR. AMERICAN: 53 ML/MIN/1.73M2
EGFR NON-AFR. AMERICAN: 54 ML/MIN/1.73M2
EGFR NON-AFR. AMERICAN: 54 ML/MIN/1.73M2
EGFR NON-AFR. AMERICAN: 58 ML/MIN/1.73M2
EGFR NON-AFR. AMERICAN: 58 ML/MIN/1.73M2
EOS%: 1.5 %
EOS%: 1.5 %
EOS%: 2.3 %
EOS%: 2.4 %
EOS%: 2.5 %
EOS%: 2.7 %
EOS%: 2.9 %
EOS%: 3 %
EOS%: 3 %
EOS%: 3.3 %
EOS%: 3.3 %
EOS%: 3.5 %
EOS%: 3.6 %
EOS%: 3.9 %
EOS%: 4.5 %
EOS%: 4.7 %
EOS%: 4.9 %
EOS%: 5.1 %
EOS%: 5.6 %
EOS%: 6.4 %
EOS%: 6.4 %
EOS%: 7.1 %
EOS: 0.1 10^3/UL
EOS: 0.2 10^3/UL
EOS: 0.3 10^3/UL
EOS: 0.4 10^3/UL
EOS: 0.5 10^3/UL
ERYTHROPOIETIN: 305 MIU/ML (ref 2.6–18.5)
FERRITIN: 12 NG/ML (ref 20–380)
FERRITIN: 12 NG/ML (ref 20–380)
FERRITIN: 121 NG/ML (ref 20–380)
FERRITIN: 13 NG/ML (ref 20–380)
FERRITIN: 133 NG/ML (ref 20–380)
FERRITIN: 14 NG/ML (ref 20–380)
FERRITIN: 151 NG/ML (ref 20–380)
FERRITIN: 180 NG/ML (ref 20–380)
FERRITIN: 19 NG/ML (ref 20–380)
FERRITIN: 232 NG/ML (ref 20–380)
FERRITIN: 27 NG/ML (ref 20–380)
FERRITIN: 28 NG/ML (ref 20–380)
FERRITIN: 36 NG/ML (ref 20–380)
FERRITIN: 7 NG/ML (ref 20–380)
FERRITIN: 76 NG/ML (ref 20–380)
FERRITIN: 76 NG/ML (ref 20–380)
FOLATE, SERUM: 16.2 NG/ML
FOLATE, SERUM: 17.3 NG/ML
FOLATE, SERUM: 17.7 NG/ML
FOLATE, SERUM: 19 NG/ML
FOLATE, SERUM: 20.9 NG/ML
FOLATE, SERUM: 21.1 NG/ML
FOLATE, SERUM: 21.9 NG/ML
FOLATE, SERUM: 9.9 NG/ML
FOLATE, SERUM: >24 NG/ML
GLOBULIN: 2.2 G/DL (CALC) (ref 1.9–3.7)
GLOBULIN: 2.2 G/DL (CALC) (ref 1.9–3.7)
GLOBULIN: 2.3 G/DL (CALC) (ref 1.9–3.7)
GLOBULIN: 2.4 G/DL (CALC) (ref 1.9–3.7)
GLOBULIN: 2.5 G/DL (CALC) (ref 1.9–3.7)
GLOBULIN: 2.5 G/DL (CALC) (ref 1.9–3.7)
GLOBULIN: 2.6 G/DL (CALC) (ref 1.9–3.7)
GLOBULIN: 2.7 G/DL (CALC) (ref 1.9–3.7)
GLOBULIN: 2.8 G/DL (CALC) (ref 1.9–3.7)
GLOBULIN: 2.9 G/DL (CALC) (ref 1.9–3.7)
GLOBULIN: 2.9 G/DL (CALC) (ref 1.9–3.7)
GLOBULIN: 3.1 G/DL (CALC) (ref 1.9–3.7)
GLOBULIN: 3.2 G/DL (CALC) (ref 1.9–3.7)
GLOBULIN: 3.2 G/DL (CALC) (ref 1.9–3.7)
GLUCOSE: 100 MG/DL (ref 65–99)
GLUCOSE: 100 MG/DL (ref 65–99)
GLUCOSE: 102 MG/DL (ref 65–99)
GLUCOSE: 104 MG/DL (ref 65–99)
GLUCOSE: 115 MG/DL (ref 65–99)
GLUCOSE: 116 MG/DL (ref 65–99)
GLUCOSE: 123 MG/DL (ref 65–99)
GLUCOSE: 132 MG/DL (ref 65–99)
GLUCOSE: 74 MG/DL (ref 65–99)
GLUCOSE: 81 MG/DL (ref 65–99)
GLUCOSE: 84 MG/DL (ref 65–99)
GLUCOSE: 85 MG/DL (ref 65–99)
GLUCOSE: 91 MG/DL (ref 65–99)
GLUCOSE: 94 MG/DL (ref 65–99)
GLUCOSE: 95 MG/DL (ref 65–99)
GLUCOSE: 97 MG/DL (ref 65–99)
HCT: 17.9 % (ref 38–54)
HCT: 18.2 % (ref 38–54)
HCT: 20.3 % (ref 38–54)
HCT: 21.6 % (ref 38–54)
HCT: 21.8 % (ref 38–54)
HCT: 26.3 % (ref 38–54)
HCT: 27.3 % (ref 38–54)
HCT: 27.6 % (ref 38–54)
HCT: 28.5 % (ref 38–54)
HCT: 29.1 % (ref 38–54)
HCT: 29.5 % (ref 38–54)
HCT: 30.3 % (ref 38–54)
HCT: 30.7 % (ref 38–54)
HCT: 31.6 % (ref 38–54)
HCT: 31.7 % (ref 38–54)
HCT: 32.4 % (ref 38–54)
HCT: 32.8 % (ref 38–54)
HCT: 33.7 % (ref 38–54)
HCT: 33.7 % (ref 38–54)
HCT: 33.9 % (ref 38–54)
HCT: 34.2 % (ref 38–54)
HCT: 35 % (ref 38–54)
HCT: 35 % (ref 38–54)
HCT: 35.8 % (ref 38–54)
HGB: 10 G/DL (ref 12–18)
HGB: 10.1 G/DL (ref 12–18)
HGB: 10.3 G/DL (ref 12–18)
HGB: 10.3 G/DL (ref 12–18)
HGB: 10.4 G/DL (ref 12–18)
HGB: 10.5 G/DL (ref 12–18)
HGB: 10.6 G/DL (ref 12–18)
HGB: 4.8 G/DL (ref 12–18)
HGB: 5.1 G/DL (ref 12–18)
HGB: 5.7 G/DL (ref 12–18)
HGB: 6 G/DL (ref 12–18)
HGB: 6 G/DL (ref 12–18)
HGB: 7.2 G/DL (ref 12–18)
HGB: 7.8 G/DL (ref 12–18)
HGB: 8 G/DL (ref 12–18)
HGB: 8.3 G/DL (ref 12–18)
HGB: 8.5 G/DL (ref 12–18)
HGB: 8.8 G/DL (ref 12–18)
HGB: 8.9 G/DL (ref 12–18)
HGB: 9 G/DL (ref 12–18)
HGB: 9.3 G/DL (ref 12–18)
HGB: 9.4 G/DL (ref 12–18)
HGB: 9.6 G/DL (ref 12–18)
HGB: 9.9 G/DL (ref 12–18)
HOC INR: 1.8
HOC INR: 2
HOC INR: 2.1
HOC INR: 2.1
HOC INR: 2.2
HOC INR: 2.2
HOC INR: 2.3
HOC INR: 2.6
HOC INR: 3.2
HOC INR: 3.5
HOC INR: 3.6
HOC INR: 3.8
HOC INR: 4.4
HOC INR: 5.8
HOC PT: 21.1
HOC PT: 21.5
HOC PT: 23.5
HOC PT: 24.8
HOC PT: 25.1
HOC PT: 26.8
HOC PT: 28.1
HOC PT: 31.4
HOC PT: 38.7
HOC PT: 42.2
HOC PT: 43.5
HOC PT: 45.4
HOC PT: 69.8
INR: 1.8 SECONDS (ref 0–7.9)
INR: 2 SECONDS (ref 0–7.9)
INR: 2.1 SECONDS (ref 0–7.9)
INR: 2.2 SECONDS (ref 0–7.9)
INR: 2.3 SECONDS (ref 0–7.9)
INR: 2.5 SECONDS (ref 0–7.9)
INR: 2.6 SECONDS (ref 0–7.9)
INR: 3.2 SECONDS (ref 0–7.9)
INR: 3.5 SECONDS (ref 0–7.9)
INR: 3.6 SECONDS (ref 0–7.9)
INR: 3.8 SECONDS (ref 0–7.9)
INR: 4.4 SECONDS (ref 0–7.9)
INR: 5.8 SECONDS (ref 0–7.9)
IRON BINDING CAPACITY: 274 MCG/DL (CALC) (ref 250–425)
IRON BINDING CAPACITY: 275 MCG/DL (CALC) (ref 250–425)
IRON BINDING CAPACITY: 282 MCG/DL (CALC) (ref 250–425)
IRON BINDING CAPACITY: 284 MCG/DL (CALC) (ref 250–425)
IRON BINDING CAPACITY: 286 MCG/DL (CALC) (ref 250–425)
IRON BINDING CAPACITY: 291 MCG/DL (CALC) (ref 250–425)
IRON BINDING CAPACITY: 300 MCG/DL (CALC) (ref 250–425)
IRON BINDING CAPACITY: 315 MCG/DL (CALC) (ref 250–425)
IRON BINDING CAPACITY: 329 MCG/DL (CALC) (ref 250–425)
IRON BINDING CAPACITY: 333 MCG/DL (CALC) (ref 250–425)
IRON BINDING CAPACITY: 335 MCG/DL (CALC) (ref 250–425)
IRON BINDING CAPACITY: 343 MCG/DL (CALC) (ref 250–425)
IRON BINDING CAPACITY: 352 MCG/DL (CALC) (ref 250–425)
IRON BINDING CAPACITY: 355 MCG/DL (CALC) (ref 250–425)
IRON BINDING CAPACITY: 357 MCG/DL (CALC) (ref 250–425)
IRON BINDING CAPACITY: 373 MCG/DL (CALC) (ref 250–425)
IRON BINDING CAPACITY: 380 MCG/DL (CALC) (ref 250–425)
IRON, TOTAL: 10 MCG/DL (ref 50–180)
IRON, TOTAL: 11 MCG/DL (ref 50–180)
IRON, TOTAL: 14 MCG/DL (ref 50–180)
IRON, TOTAL: 17 MCG/DL (ref 50–180)
IRON, TOTAL: 17 MCG/DL (ref 50–180)
IRON, TOTAL: 19 MCG/DL (ref 50–180)
IRON, TOTAL: 19 MCG/DL (ref 50–180)
IRON, TOTAL: 22 MCG/DL (ref 50–180)
IRON, TOTAL: 24 MCG/DL (ref 50–180)
IRON, TOTAL: 27 MCG/DL (ref 50–180)
IRON, TOTAL: 27 MCG/DL (ref 50–180)
IRON, TOTAL: 29 MCG/DL (ref 50–180)
IRON, TOTAL: 35 MCG/DL (ref 50–180)
IRON, TOTAL: 43 MCG/DL (ref 50–180)
IRON, TOTAL: 48 MCG/DL (ref 50–180)
IRON, TOTAL: 69 MCG/DL (ref 50–180)
IRON, TOTAL: <10 MCG/DL (ref 50–180)
LYMPH%: 10.1 % (ref 12–44)
LYMPH%: 10.8 % (ref 12–44)
LYMPH%: 10.9 % (ref 12–44)
LYMPH%: 11 % (ref 12–44)
LYMPH%: 11.3 % (ref 12–44)
LYMPH%: 11.8 % (ref 12–44)
LYMPH%: 12 % (ref 12–44)
LYMPH%: 13.3 % (ref 12–44)
LYMPH%: 13.3 % (ref 12–44)
LYMPH%: 13.6 % (ref 12–44)
LYMPH%: 14.7 % (ref 12–44)
LYMPH%: 15.1 % (ref 12–44)
LYMPH%: 17.1 % (ref 12–44)
LYMPH%: 17.4 % (ref 12–44)
LYMPH%: 17.7 % (ref 12–44)
LYMPH%: 17.7 % (ref 12–44)
LYMPH%: 18.6 % (ref 12–44)
LYMPH%: 18.9 % (ref 12–44)
LYMPH%: 19.5 % (ref 12–44)
LYMPH%: 19.9 % (ref 12–44)
LYMPH%: 20.4 % (ref 12–44)
LYMPH%: 22 % (ref 12–44)
LYMPH%: 9.1 % (ref 12–44)
LYMPH%: 9.5 % (ref 12–44)
LYMPH: 0.8 10^3/UL (ref 0.8–2.8)
LYMPH: 0.8 10^3/UL (ref 0.8–2.8)
LYMPH: 0.9 10^3/UL (ref 0.8–2.8)
LYMPH: 1 10^3/UL (ref 0.8–2.8)
LYMPH: 1.1 10^3/UL (ref 0.8–2.8)
LYMPH: 1.2 10^3/UL (ref 0.8–2.8)
LYMPH: 1.3 10^3/UL (ref 0.8–2.8)
LYMPH: 1.4 10^3/UL (ref 0.8–2.8)
LYMPH: 1.6 10^3/UL (ref 0.8–2.8)
MCH: 18.8 PG (ref 26–33)
MCH: 19.2 PG (ref 26–33)
MCH: 19.8 PG (ref 26–33)
MCH: 20.8 PG (ref 26–33)
MCH: 20.9 PG (ref 26–33)
MCH: 21.7 PG (ref 26–33)
MCH: 21.8 PG (ref 26–33)
MCH: 22 PG (ref 26–33)
MCH: 22.6 PG (ref 26–33)
MCH: 22.9 PG (ref 26–33)
MCH: 22.9 PG (ref 26–33)
MCH: 23.6 PG (ref 26–33)
MCH: 23.8 PG (ref 26–33)
MCH: 24 PG (ref 26–33)
MCH: 24.1 PG (ref 26–33)
MCH: 24.3 PG (ref 26–33)
MCH: 24.4 PG (ref 26–33)
MCH: 24.8 PG (ref 26–33)
MCH: 24.9 PG (ref 26–33)
MCH: 24.9 PG (ref 26–33)
MCH: 25.2 PG (ref 26–33)
MCH: 25.9 PG (ref 26–33)
MCH: 26.2 PG (ref 26–33)
MCH: 26.2 PG (ref 26–33)
MCHC: 26.1 G/DL (ref 31–36)
MCHC: 26.8 G/DL (ref 31–36)
MCHC: 27.4 G/DL (ref 31–36)
MCHC: 27.4 G/DL (ref 31–36)
MCHC: 27.8 G/DL (ref 31–36)
MCHC: 28 G/DL (ref 31–36)
MCHC: 28.3 G/DL (ref 31–36)
MCHC: 28.3 G/DL (ref 31–36)
MCHC: 28.7 G/DL (ref 31–36)
MCHC: 28.9 G/DL (ref 31–36)
MCHC: 29.2 G/DL (ref 31–36)
MCHC: 29.3 G/DL (ref 31–36)
MCHC: 29.3 G/DL (ref 31–36)
MCHC: 29.6 G/DL (ref 31–36)
MCHC: 29.6 G/DL (ref 31–36)
MCHC: 29.7 G/DL (ref 31–36)
MCHC: 29.7 G/DL (ref 31–36)
MCHC: 30.1 G/DL (ref 31–36)
MCHC: 30.2 G/DL (ref 31–36)
MCHC: 30.4 G/DL (ref 31–36)
MCHC: 30.6 G/DL (ref 31–36)
MCHC: 30.9 G/DL (ref 31–36)
MCHC: 31.2 G/DL (ref 31–36)
MCHC: 31.4 G/DL (ref 31–36)
MCV: 71.6 FML (ref 82–100)
MCV: 71.9 FML (ref 82–100)
MCV: 72.3 FML (ref 82–100)
MCV: 74.3 FML (ref 82–100)
MCV: 75.3 FML (ref 82–100)
MCV: 76.2 FML (ref 82–100)
MCV: 76.3 FML (ref 82–100)
MCV: 76.5 FML (ref 82–100)
MCV: 76.8 FML (ref 82–100)
MCV: 77.1 FML (ref 82–100)
MCV: 78.6 FML (ref 82–100)
MCV: 79.7 FML (ref 82–100)
MCV: 80.5 FML (ref 82–100)
MCV: 80.8 FML (ref 82–100)
MCV: 81.5 FML (ref 82–100)
MCV: 82 FML (ref 82–100)
MCV: 82.4 FML (ref 82–100)
MCV: 83.1 FML (ref 82–100)
MCV: 83.4 FML (ref 82–100)
MCV: 84.1 FML (ref 82–100)
MCV: 84.5 FML (ref 82–100)
MCV: 85 FML (ref 82–100)
MCV: 85 FML (ref 82–100)
MCV: 86.8 FML (ref 82–100)
MONO%: 11.2 % (ref 2–12)
MONO%: 11.5 % (ref 2–12)
MONO%: 11.6 % (ref 2–12)
MONO%: 12.3 % (ref 2–12)
MONO%: 6.3 % (ref 2–12)
MONO%: 6.4 % (ref 2–12)
MONO%: 6.6 % (ref 2–12)
MONO%: 6.8 % (ref 2–12)
MONO%: 7.1 % (ref 2–12)
MONO%: 8 % (ref 2–12)
MONO%: 8.1 % (ref 2–12)
MONO%: 8.2 % (ref 2–12)
MONO%: 8.7 % (ref 2–12)
MONO%: 8.7 % (ref 2–12)
MONO%: 8.8 % (ref 2–12)
MONO%: 8.8 % (ref 2–12)
MONO%: 9 % (ref 2–12)
MONO%: 9.1 % (ref 2–12)
MONO%: 9.1 % (ref 2–12)
MONO%: 9.3 % (ref 2–12)
MONO%: 9.4 % (ref 2–12)
MONO%: 9.4 % (ref 2–12)
MONO: 0.5 10^3/UL (ref 0.2–1)
MONO: 0.6 10^3/UL (ref 0.2–1)
MONO: 0.7 10^3/UL (ref 0.2–1)
MONO: 0.8 10^3/UL (ref 0.2–1)
MONO: 0.8 10^3/UL (ref 0.2–1)
MONO: 0.9 10^3/UL (ref 0.2–1)
MONO: 0.9 10^3/UL (ref 0.2–1)
MONO: 1 10^3/UL (ref 0.2–1)
MPV: 10 FML (ref 8.6–11.7)
MPV: 10.1 FML (ref 8.6–11.7)
MPV: 10.2 FML (ref 8.6–11.7)
MPV: 10.2 FML (ref 8.6–11.7)
MPV: 10.5 FML (ref 8.6–11.7)
MPV: 10.8 FML (ref 8.6–11.7)
MPV: 11 FML (ref 8.6–11.7)
MPV: 8.9 FML (ref 8.6–11.7)
MPV: 8.9 FML (ref 8.6–11.7)
MPV: 9 FML (ref 8.6–11.7)
MPV: 9.1 FML (ref 8.6–11.7)
MPV: 9.1 FML (ref 8.6–11.7)
MPV: 9.3 FML (ref 8.6–11.7)
MPV: 9.5 FML (ref 8.6–11.7)
MPV: 9.5 FML (ref 8.6–11.7)
MPV: 9.7 FML (ref 8.6–11.7)
MPV: 9.9 FML (ref 8.6–11.7)
NEUT%: 62.7 % (ref 47–76)
NEUT%: 64.9 % (ref 47–76)
NEUT%: 66.3 % (ref 47–76)
NEUT%: 66.4 % (ref 47–76)
NEUT%: 66.8 % (ref 47–76)
NEUT%: 67.3 % (ref 47–76)
NEUT%: 68.8 % (ref 47–76)
NEUT%: 69.1 % (ref 47–76)
NEUT%: 69.5 % (ref 47–76)
NEUT%: 70.9 % (ref 47–76)
NEUT%: 72.2 % (ref 47–76)
NEUT%: 72.4 % (ref 47–76)
NEUT%: 72.5 % (ref 47–76)
NEUT%: 73.6 % (ref 47–76)
NEUT%: 73.8 % (ref 47–76)
NEUT%: 74.6 % (ref 47–76)
NEUT%: 74.8 % (ref 47–76)
NEUT%: 74.9 % (ref 47–76)
NEUT%: 74.9 % (ref 47–76)
NEUT%: 76 % (ref 47–76)
NEUT%: 76.2 % (ref 47–76)
NEUT%: 78.5 % (ref 47–76)
NEUT%: 79.9 % (ref 47–76)
NEUT%: 80.3 % (ref 47–76)
NEUT: 3.1 10^3/UL (ref 1.5–7.1)
NEUT: 3.3 10^3/UL (ref 1.5–7.1)
NEUT: 3.9 10^3/UL (ref 1.5–7.1)
NEUT: 4.3 10^3/UL (ref 1.5–7.1)
NEUT: 4.3 10^3/UL (ref 1.5–7.1)
NEUT: 4.4 10^3/UL (ref 1.5–7.1)
NEUT: 4.6 10^3/UL (ref 1.5–7.1)
NEUT: 4.8 10^3/UL (ref 1.5–7.1)
NEUT: 5.1 10^3/UL (ref 1.5–7.1)
NEUT: 5.2 10^3/UL (ref 1.5–7.1)
NEUT: 5.4 10^3/UL (ref 1.5–7.1)
NEUT: 5.7 10^3/UL (ref 1.5–7.1)
NEUT: 6 10^3/UL (ref 1.5–7.1)
NEUT: 6.5 10^3/UL (ref 1.5–7.1)
NEUT: 6.7 10^3/UL (ref 1.5–7.1)
NEUT: 7 10^3/UL (ref 1.5–7.1)
NEUT: 7.1 10^3/UL (ref 1.5–7.1)
NEUT: 7.9 10^3/UL (ref 1.5–7.1)
NEUT: 8.2 10^3/UL (ref 1.5–7.1)
NEUT: 8.4 10^3/UL (ref 1.5–7.1)
PLT: 271 10^3/UL (ref 150–375)
PLT: 285 10^3/UL (ref 150–375)
PLT: 305 10^3/UL (ref 150–375)
PLT: 306 10^3/UL (ref 150–375)
PLT: 311 10^3/UL (ref 150–375)
PLT: 312 10^3/UL (ref 150–375)
PLT: 348 10^3/UL (ref 150–375)
PLT: 360 10^3/UL (ref 150–375)
PLT: 374 10^3/UL (ref 150–375)
PLT: 390 10^3/UL (ref 150–375)
PLT: 391 10^3/UL (ref 150–375)
PLT: 395 10^3/UL (ref 150–375)
PLT: 396 10^3/UL (ref 150–375)
PLT: 402 10^3/UL (ref 150–375)
PLT: 403 10^3/UL (ref 150–375)
PLT: 411 10^3/UL (ref 150–375)
PLT: 416 10^3/UL (ref 150–375)
PLT: 489 10^3/UL (ref 150–375)
PLT: 492 10^3/UL (ref 150–375)
PLT: 501 10^3/UL (ref 150–375)
PLT: 555 10^3/UL (ref 150–375)
PLT: 562 10^3/UL (ref 150–375)
PLT: 586 10^3/UL (ref 150–375)
PLT: 637 10^3/UL (ref 150–375)
POTASSIUM: 4.3 MMOL/L (ref 3.5–5.3)
POTASSIUM: 4.5 MMOL/L (ref 3.5–5.3)
POTASSIUM: 4.6 MMOL/L (ref 3.5–5.3)
POTASSIUM: 4.7 MMOL/L (ref 3.5–5.3)
POTASSIUM: 4.8 MMOL/L (ref 3.5–5.3)
POTASSIUM: 5 MMOL/L (ref 3.5–5.3)
POTASSIUM: 5.1 MMOL/L (ref 3.5–5.3)
PROTEIN, TOTAL: 5.8 G/DL (ref 6.1–8.1)
PROTEIN, TOTAL: 5.9 G/DL (ref 6.1–8.1)
PROTEIN, TOTAL: 5.9 G/DL (ref 6.1–8.1)
PROTEIN, TOTAL: 6 G/DL (ref 6.1–8.1)
PROTEIN, TOTAL: 6.1 G/DL (ref 6.1–8.1)
PROTEIN, TOTAL: 6.2 G/DL (ref 6.1–8.1)
PROTEIN, TOTAL: 6.3 G/DL (ref 6.1–8.1)
PROTEIN, TOTAL: 6.4 G/DL (ref 6.1–8.1)
PROTEIN, TOTAL: 6.4 G/DL (ref 6.1–8.1)
PROTEIN, TOTAL: 6.5 G/DL (ref 6.1–8.1)
PROTEIN, TOTAL: 6.6 G/DL (ref 6.1–8.1)
PROTEIN, TOTAL: 6.8 G/DL (ref 6.1–8.1)
PT: 21.1 1000/UL (ref 0–39.9)
PT: 21.5 1000/UL (ref 0–39.9)
PT: 23.5 1000/UL (ref 0–39.9)
PT: 24.8 1000/UL (ref 0–39.9)
PT: 26.8 1000/UL (ref 0–39.9)
PT: 28.1 1000/UL (ref 0–39.9)
PT: 29.4 1000/UL (ref 0–39.9)
PT: 31.4 1000/UL (ref 0–39.9)
PT: 38.7 1000/UL (ref 0–39.9)
PT: 42.2 1000/UL (ref 0–39.9)
PT: 43.5 1000/UL (ref 0–39.9)
PT: 45.4 1000/UL (ref 0–39.9)
PT: 69.8 1000/UL (ref 0–39.9)
RBC: 2.45 10^6/UL (ref 4.2–6.2)
RBC: 2.5 10^6/UL (ref 4.2–6.2)
RBC: 2.66 10^6/UL (ref 4.2–6.2)
RBC: 2.87 10^6/UL (ref 4.2–6.2)
RBC: 3.03 10^6/UL (ref 4.2–6.2)
RBC: 3.21 10^6/UL (ref 4.2–6.2)
RBC: 3.3 10^6/UL (ref 4.2–6.2)
RBC: 3.4 10^6/UL (ref 4.2–6.2)
RBC: 3.5 10^6/UL (ref 4.2–6.2)
RBC: 3.62 10^6/UL (ref 4.2–6.2)
RBC: 3.79 10^6/UL (ref 4.2–6.2)
RBC: 3.81 10^6/UL (ref 4.2–6.2)
RBC: 3.93 10^6/UL (ref 4.2–6.2)
RBC: 3.94 10^6/UL (ref 4.2–6.2)
RBC: 3.94 10^6/UL (ref 4.2–6.2)
RBC: 3.98 10^6/UL (ref 4.2–6.2)
RBC: 4 10^6/UL (ref 4.2–6.2)
RBC: 4.14 10^6/UL (ref 4.2–6.2)
RBC: 4.16 10^6/UL (ref 4.2–6.2)
RBC: 4.17 10^6/UL (ref 4.2–6.2)
RBC: 4.17 10^6/UL (ref 4.2–6.2)
RBC: 4.29 10^6/UL (ref 4.2–6.2)
RBC: 4.39 10^6/UL (ref 4.2–6.2)
RBC: 4.43 10^6/UL (ref 4.2–6.2)
RDW-CV: 15 %
RDW-CV: 15.7 %
RDW-CV: 16.9 %
RDW-CV: 17.8 %
RDW-CV: 18.2 %
RDW-CV: 18.3 %
RDW-CV: 18.7 %
RDW-CV: 20 %
RDW-CV: 20.6 %
RDW-CV: 21 %
RDW-CV: 21.4 %
RDW-CV: 21.7 %
RDW-CV: 22.4 %
RDW-CV: 22.6 %
RDW-CV: 22.7 %
RDW-CV: 22.7 %
RDW-CV: 22.8 %
RDW-CV: 24 %
RDW-CV: 24.9 %
RDW-CV: 25.4 %
RDW-CV: 28.8 %
RDW-CV: 30.9 %
RDW-SD: 39.3 FML (ref 36–50)
RDW-SD: 43 FML (ref 36–50)
RDW-SD: 45.1 FML (ref 36–50)
RDW-SD: 49.3 FML (ref 36–50)
RDW-SD: 51.4 FML (ref 36–50)
RDW-SD: 51.9 FML (ref 36–50)
RDW-SD: 52 FML (ref 36–50)
RDW-SD: 52.2 FML (ref 36–50)
RDW-SD: 54.5 FML (ref 36–50)
RDW-SD: 54.7 FML (ref 36–50)
RDW-SD: 57.3 FML (ref 36–50)
RDW-SD: 60.4 FML (ref 36–50)
RDW-SD: 60.8 FML (ref 36–50)
RDW-SD: 63 FML (ref 36–50)
RDW-SD: 63.7 FML (ref 36–50)
RDW-SD: 64.3 FML (ref 36–50)
RDW-SD: 65.5 FML (ref 36–50)
RDW-SD: 67.4 FML (ref 36–50)
RDW-SD: 70.2 FML (ref 36–50)
RDW-SD: 71.3 FML (ref 36–50)
RDW-SD: 74.1 FML (ref 36–50)
RDW-SD: 76.9 FML (ref 36–50)
SODIUM: 139 MMOL/L (ref 135–146)
SODIUM: 140 MMOL/L (ref 135–146)
SODIUM: 140 MMOL/L (ref 135–146)
SODIUM: 141 MMOL/L (ref 135–146)
SODIUM: 142 MMOL/L (ref 135–146)
SODIUM: 144 MMOL/L (ref 135–146)
UREA NITROGEN (BUN): 19 MG/DL (ref 7–25)
UREA NITROGEN (BUN): 21 MG/DL (ref 7–25)
UREA NITROGEN (BUN): 22 MG/DL (ref 7–25)
UREA NITROGEN (BUN): 22 MG/DL (ref 7–25)
UREA NITROGEN (BUN): 23 MG/DL (ref 7–25)
UREA NITROGEN (BUN): 26 MG/DL (ref 7–25)
UREA NITROGEN (BUN): 27 MG/DL (ref 7–25)
UREA NITROGEN (BUN): 28 MG/DL (ref 7–25)
UREA NITROGEN (BUN): 29 MG/DL (ref 7–25)
UREA NITROGEN (BUN): 29 MG/DL (ref 7–25)
UREA NITROGEN (BUN): 30 MG/DL (ref 7–25)
UREA NITROGEN (BUN): 31 MG/DL (ref 7–25)
UREA NITROGEN (BUN): 32 MG/DL (ref 7–25)
UREA NITROGEN (BUN): 36 MG/DL (ref 7–25)
VITAMIN B12: 461 PG/ML (ref 200–1100)
VITAMIN B12: 491 PG/ML (ref 200–1100)
VITAMIN B12: 506 PG/ML (ref 200–1100)
VITAMIN B12: 534 PG/ML (ref 200–1100)
VITAMIN B12: 549 PG/ML (ref 200–1100)
VITAMIN B12: 550 PG/ML (ref 200–1100)
VITAMIN B12: 564 PG/ML (ref 200–1100)
VITAMIN B12: 575 PG/ML (ref 200–1100)
VITAMIN B12: 604 PG/ML (ref 200–1100)
VITAMIN B12: 620 PG/ML (ref 200–1100)
VITAMIN B12: 683 PG/ML (ref 200–1100)
VITAMIN B12: 783 PG/ML (ref 200–1100)
VITAMIN B12: 784 PG/ML (ref 200–1100)
VITAMIN B12: 834 PG/ML (ref 200–1100)
WBC: 10.5 10^3/UL (ref 4.3–11)
WBC: 10.5 10^3/UL (ref 4.3–11)
WBC: 4.8 10^3/UL (ref 4.3–11)
WBC: 5.2 10^3/UL (ref 4.3–11)
WBC: 5.8 10^3/UL (ref 4.3–11)
WBC: 6.2 10^3/UL (ref 4.3–11)
WBC: 6.4 10^3/UL (ref 4.3–11)
WBC: 6.5 10^3/UL (ref 4.3–11)
WBC: 6.7 10^3/UL (ref 4.3–11)
WBC: 6.8 10^3/UL (ref 4.3–11)
WBC: 7.1 10^3/UL (ref 4.3–11)
WBC: 7.2 10^3/UL (ref 4.3–11)
WBC: 7.3 10^3/UL (ref 4.3–11)
WBC: 7.4 10^3/UL (ref 4.3–11)
WBC: 7.6 10^3/UL (ref 4.3–11)
WBC: 7.6 10^3/UL (ref 4.3–11)
WBC: 7.9 10^3/UL (ref 4.3–11)
WBC: 8.1 10^3/UL (ref 4.3–11)
WBC: 8.1 10^3/UL (ref 4.3–11)
WBC: 8.9 10^3/UL (ref 4.3–11)
WBC: 9.1 10^3/UL (ref 4.3–11)
WBC: 9.4 10^3/UL (ref 4.3–11)
WBC: 9.5 10^3/UL (ref 4.3–11)
WBC: 9.9 10^3/UL (ref 4.3–11)

## 2020-10-11 VITALS — DIASTOLIC BLOOD PRESSURE: 60 MMHG | SYSTOLIC BLOOD PRESSURE: 124 MMHG | WEIGHT: 169 LBS

## 2020-10-11 VITALS — DIASTOLIC BLOOD PRESSURE: 50 MMHG | WEIGHT: 180.8 LBS | SYSTOLIC BLOOD PRESSURE: 122 MMHG

## 2020-10-11 VITALS — SYSTOLIC BLOOD PRESSURE: 130 MMHG | DIASTOLIC BLOOD PRESSURE: 64 MMHG | WEIGHT: 181 LBS

## 2020-10-11 VITALS
HEIGHT: 74 IN | SYSTOLIC BLOOD PRESSURE: 120 MMHG | BODY MASS INDEX: 23.64 KG/M2 | DIASTOLIC BLOOD PRESSURE: 44 MMHG | WEIGHT: 184.19 LBS

## 2020-10-11 VITALS — SYSTOLIC BLOOD PRESSURE: 142 MMHG | DIASTOLIC BLOOD PRESSURE: 64 MMHG | WEIGHT: 178.99 LBS

## 2020-10-11 VITALS
DIASTOLIC BLOOD PRESSURE: 48 MMHG | HEIGHT: 71 IN | SYSTOLIC BLOOD PRESSURE: 128 MMHG | WEIGHT: 173.59 LBS | BODY MASS INDEX: 24.3 KG/M2

## 2020-10-11 VITALS — HEART RATE: 84 BPM | DIASTOLIC BLOOD PRESSURE: 70 MMHG | WEIGHT: 174.69 LBS | SYSTOLIC BLOOD PRESSURE: 154 MMHG

## 2020-10-11 VITALS — DIASTOLIC BLOOD PRESSURE: 66 MMHG | SYSTOLIC BLOOD PRESSURE: 132 MMHG | WEIGHT: 177.4 LBS

## 2020-10-11 VITALS — DIASTOLIC BLOOD PRESSURE: 52 MMHG | SYSTOLIC BLOOD PRESSURE: 118 MMHG | WEIGHT: 184 LBS

## 2020-10-11 VITALS — WEIGHT: 176.39 LBS | SYSTOLIC BLOOD PRESSURE: 134 MMHG | DIASTOLIC BLOOD PRESSURE: 58 MMHG

## 2020-10-11 VITALS — DIASTOLIC BLOOD PRESSURE: 62 MMHG | WEIGHT: 177.01 LBS | SYSTOLIC BLOOD PRESSURE: 132 MMHG

## 2020-10-11 VITALS
SYSTOLIC BLOOD PRESSURE: 124 MMHG | WEIGHT: 188.41 LBS | DIASTOLIC BLOOD PRESSURE: 60 MMHG | HEIGHT: 74 IN | BODY MASS INDEX: 24.18 KG/M2

## 2020-10-11 VITALS — WEIGHT: 181.99 LBS | SYSTOLIC BLOOD PRESSURE: 132 MMHG | DIASTOLIC BLOOD PRESSURE: 58 MMHG

## 2020-10-11 VITALS — WEIGHT: 174.6 LBS | SYSTOLIC BLOOD PRESSURE: 140 MMHG | DIASTOLIC BLOOD PRESSURE: 58 MMHG

## 2020-10-11 VITALS — SYSTOLIC BLOOD PRESSURE: 136 MMHG | DIASTOLIC BLOOD PRESSURE: 64 MMHG | WEIGHT: 178.99 LBS

## 2020-10-11 VITALS — SYSTOLIC BLOOD PRESSURE: 126 MMHG | DIASTOLIC BLOOD PRESSURE: 60 MMHG | WEIGHT: 184 LBS

## 2020-10-11 VITALS — DIASTOLIC BLOOD PRESSURE: 58 MMHG | WEIGHT: 178.4 LBS | SYSTOLIC BLOOD PRESSURE: 126 MMHG

## 2020-10-11 VITALS — DIASTOLIC BLOOD PRESSURE: 48 MMHG | SYSTOLIC BLOOD PRESSURE: 122 MMHG | WEIGHT: 183 LBS

## 2020-12-22 NOTE — PROGRESS NOTES
[facility]    Progress Note    Achilles More Patient Status:  Inpatient    1929 MRN [id number]   Location [facility] 4W/SW/SE Attending Floresita Davila MD   Hosp Day # 2 PCP No primary care provider on file.         Subjec
Home

## 2022-04-21 NOTE — HOSPICE RN NOTE
Patient is GIP appropriate to manage his severe abdominal pain. 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 POC was discussed with Dr Adam Gregory and [address] [place] [address] Director Dr Keo Prabhakar.   Raúl Normal vision: sees adequately in most situations; can see medication labels, newsprint